# Patient Record
Sex: FEMALE | Race: WHITE | Employment: UNEMPLOYED | URBAN - METROPOLITAN AREA
[De-identification: names, ages, dates, MRNs, and addresses within clinical notes are randomized per-mention and may not be internally consistent; named-entity substitution may affect disease eponyms.]

---

## 2017-01-10 PROBLEM — M47.816 SPONDYLOSIS OF LUMBAR REGION WITHOUT MYELOPATHY OR RADICULOPATHY: Status: ACTIVE | Noted: 2017-01-10

## 2017-08-12 PROCEDURE — 81001 URINALYSIS AUTO W/SCOPE: CPT | Performed by: INTERNAL MEDICINE

## 2017-08-18 PROCEDURE — 88175 CYTOPATH C/V AUTO FLUID REDO: CPT | Performed by: OBSTETRICS & GYNECOLOGY

## 2017-08-18 PROCEDURE — 87624 HPV HI-RISK TYP POOLED RSLT: CPT | Performed by: OBSTETRICS & GYNECOLOGY

## 2017-10-09 ENCOUNTER — OFFICE VISIT (OUTPATIENT)
Dept: FAMILY MEDICINE CLINIC | Facility: CLINIC | Age: 62
End: 2017-10-09

## 2017-10-09 VITALS
HEIGHT: 64 IN | HEART RATE: 66 BPM | BODY MASS INDEX: 23.73 KG/M2 | DIASTOLIC BLOOD PRESSURE: 88 MMHG | SYSTOLIC BLOOD PRESSURE: 132 MMHG | TEMPERATURE: 99 F | WEIGHT: 139 LBS | RESPIRATION RATE: 16 BRPM

## 2017-10-09 DIAGNOSIS — F41.1 ANXIETY STATE: ICD-10-CM

## 2017-10-09 DIAGNOSIS — F51.01 PRIMARY INSOMNIA: ICD-10-CM

## 2017-10-09 DIAGNOSIS — K21.9 GASTROESOPHAGEAL REFLUX DISEASE WITHOUT ESOPHAGITIS: ICD-10-CM

## 2017-10-09 DIAGNOSIS — I10 ESSENTIAL HYPERTENSION: ICD-10-CM

## 2017-10-09 DIAGNOSIS — M54.16 LUMBAR RADICULOPATHY: Primary | ICD-10-CM

## 2017-10-09 DIAGNOSIS — E03.8 OTHER SPECIFIED HYPOTHYROIDISM: ICD-10-CM

## 2017-10-09 PROCEDURE — 99204 OFFICE O/P NEW MOD 45 MIN: CPT | Performed by: INTERNAL MEDICINE

## 2017-10-09 RX ORDER — LORAZEPAM 0.5 MG/1
0.5 TABLET ORAL EVERY 6 HOURS PRN
Qty: 30 TABLET | Refills: 1 | Status: SHIPPED | OUTPATIENT
Start: 2017-10-09 | End: 2017-11-27

## 2017-10-09 RX ORDER — LEVOTHYROXINE SODIUM 0.05 MG/1
TABLET ORAL
Qty: 90 TABLET | Refills: 3 | Status: SHIPPED | OUTPATIENT
Start: 2017-10-12 | End: 2018-08-18

## 2017-10-09 RX ORDER — AMITRIPTYLINE HYDROCHLORIDE 100 MG/1
TABLET, FILM COATED ORAL
Qty: 90 TABLET | Refills: 0 | Status: SHIPPED | OUTPATIENT
Start: 2017-10-09 | End: 2017-11-27

## 2017-10-09 RX ORDER — OMEPRAZOLE 40 MG/1
CAPSULE, DELAYED RELEASE ORAL
Qty: 90 CAPSULE | Refills: 1 | Status: SHIPPED | OUTPATIENT
Start: 2017-10-09 | End: 2018-04-02

## 2017-10-09 RX ORDER — METHOCARBAMOL 750 MG/1
TABLET, FILM COATED ORAL
Qty: 90 TABLET | Refills: 0 | Status: SHIPPED | OUTPATIENT
Start: 2017-10-09 | End: 2017-11-27 | Stop reason: ALTCHOICE

## 2017-10-09 NOTE — PROGRESS NOTES
CC: Patient presents with:  Establish Care  Insomnia  Anxiety  Imm/Inj: flu shot today       HPI:     Here for establish   Has been on and off doxycycline / gets some hair falling out.     HLD   Denies any issues   Has been taking lovastatin 40 mg q day triamcinolone acetonide 0.025 % External Lotion Apply daily to eyebrow Disp: 60 mL Rfl: 2   Clobetasol Propionate 0.05 % External Gel Apply daily to scalp Disp: 60 g Rfl: 2   Fluocinonide 0.05 % External Solution Apply QHS to scalp Disp: 60 mL Rfl: 2   i otherwise, denied any fevers chills or night sweats   RESPIRATORY: denies shortness of breath   CARDIOVASCULAR: denies chest pain  GI: denies abdominal pain    EXAM:   /88   Pulse 66   Temp 98.8 °F (37.1 °C) (Oral)   Resp 16   Ht 64\"   Wt 139 lb   B zoloft  -encouraged patient to seek care with lester Arrieta at this time    Primary insomnia  -increase amitiptyline to 100 mg q day   -advised of side effects and adverse effects of this medication    Other specified hypothyroidism  -continue synthroid

## 2017-10-16 ENCOUNTER — TELEPHONE (OUTPATIENT)
Dept: FAMILY MEDICINE CLINIC | Facility: CLINIC | Age: 62
End: 2017-10-16

## 2017-10-16 NOTE — TELEPHONE ENCOUNTER
Dr. Regan Pride- New rx received for Amitriptyline HCl 100 MG Oral Tab90 [id number]/9/2017. Pt  just picked up the 50mg on 9/15/17. Do you want the 100mg filled. Please advise.

## 2017-11-03 ENCOUNTER — TELEPHONE (OUTPATIENT)
Dept: FAMILY MEDICINE CLINIC | Facility: CLINIC | Age: 62
End: 2017-11-03

## 2017-11-03 RX ORDER — SERTRALINE HYDROCHLORIDE 100 MG/1
100 TABLET, FILM COATED ORAL DAILY
Qty: 90 TABLET | Refills: 1 | Status: SHIPPED | OUTPATIENT
Start: 2017-11-03 | End: 2018-02-07

## 2017-11-03 RX ORDER — SERTRALINE HYDROCHLORIDE 100 MG/1
50 TABLET, FILM COATED ORAL DAILY
Qty: 90 TABLET | Refills: 3 | Status: SHIPPED | OUTPATIENT
Start: 2017-11-03 | End: 2017-11-27 | Stop reason: DRUGHIGH

## 2017-11-03 NOTE — TELEPHONE ENCOUNTER
Pt sts that Dr Zahraa Mendez told her to take 2 daily and not 1 daily, sts that she wants to clarify correct dosage and if it is to be 2 daily she wants a new rx sent to mail order pharmacy. Sertraline HCl 50 MG Oral Tab 90 tablet 1 10/9/2017     Sig - Route:  Ta

## 2017-11-03 NOTE — TELEPHONE ENCOUNTER
Time started: 133    Time ended: 137    Total time spent on chart:  4min    Patient said you told her to increase her sertraline from 50mg to 100mg daily. I did see the increase in Amitriptyline which I discussed with patient.   Patient said is was also th

## 2017-11-03 NOTE — TELEPHONE ENCOUNTER
If she has been taking sertaline 100 mg and tolerating ok, ok to continue. Sometimes sertraline and amitriptyline can interact at higher doses.    If she has increased dose for past month and not had issues ok to keep current tdose, rx sent

## 2017-11-03 NOTE — TELEPHONE ENCOUNTER
Patient has been taking 2 of the 50mg Sertraline and doing well. There was an order for Sertraline 100mg sent to pharmacy but it is for 1/2 tablet daily quantity #90. Pharmacy will not fill that way. New RX sent as one daily of 100mg.     Time started: 2

## 2017-11-10 ENCOUNTER — TELEPHONE (OUTPATIENT)
Dept: FAMILY MEDICINE CLINIC | Facility: CLINIC | Age: 62
End: 2017-11-10

## 2017-11-10 NOTE — TELEPHONE ENCOUNTER
Time started: 113    Time ended: 116    Total time spent on chart: 3 min    I spoke with patient to let her know a new RX was sent 11/3/17 and listed as one daily with new directions after the other RX was sent as 1/2. They sent the old directions to her.

## 2017-11-10 NOTE — TELEPHONE ENCOUNTER
Dr. Cornelius Rosales- Pt needs clarification on doze of Sertraline HCl 100 MG Oral Tab90 [id number]/3/2017. Pt understood that she was going to be taking 100mg. Instructions state cut in half. Please clarify if she should take 100mg or 50mg.

## 2017-12-01 NOTE — PROGRESS NOTES
CC: Patient presents with: Anxiety  Insomnia       HPI:     Here for follow up   Amitriptyline 100 mg q day has been working well for her.    Denies any issues    Struggling with anxiety still   Still having some symptoms   Wants refill of lorazepam fo right axillary node infection, formed absess and required hospitalization   • Human papillomavirus in conditions classified elsewhere and of unspecified site     conization   • Hypercholesterolemia    • HYPERLIPIDEMIA    • Hyperthyroidism 1/16   • Lumba orders of the defined types were placed in this encounter.        Signed Prescriptions Disp Refills    Amitriptyline HCl 100 MG Oral Tab 90 tablet 1      Sig: TAKE 1 BY MOUTH NIGHTLY      LORazepam 0.5 MG Oral Tab 30 tablet 2      Sig: Take 1 tablet (0.5 mg

## 2017-12-28 NOTE — TELEPHONE ENCOUNTER
Requesting Tramadol-Acetaminophen   LOV: 11/27/17  RTC: 3 months   Last Relevant Labs: n/a   Filled: 10/9/17 #30 with 2 refills    Future Appointments  Date Time Provider Og Martinez   12/29/2017 9:30 AM [facility], Pain Management [facility] AT

## 2018-01-03 NOTE — TELEPHONE ENCOUNTER
Called pt to discuss further, claims pain clinic not helping much. Unaware that she was to wean off Tramadol, claims only taking 1 every night.   Needs to establish care with new PCP (Dr. Shauna Muhammad), scheduled Future Appointment:  Date Time Provider Justus Ramos

## 2018-01-17 ENCOUNTER — OFFICE VISIT (OUTPATIENT)
Dept: FAMILY MEDICINE CLINIC | Facility: CLINIC | Age: 63
End: 2018-01-17

## 2018-01-17 VITALS
SYSTOLIC BLOOD PRESSURE: 130 MMHG | DIASTOLIC BLOOD PRESSURE: 80 MMHG | HEIGHT: 64 IN | BODY MASS INDEX: 25.18 KG/M2 | WEIGHT: 147.5 LBS | TEMPERATURE: 99 F | HEART RATE: 72 BPM | RESPIRATION RATE: 16 BRPM

## 2018-01-17 DIAGNOSIS — Z76.89 ENCOUNTER TO ESTABLISH CARE WITH NEW DOCTOR: Primary | ICD-10-CM

## 2018-01-17 DIAGNOSIS — M51.36 DDD (DEGENERATIVE DISC DISEASE), LUMBAR: ICD-10-CM

## 2018-01-17 PROCEDURE — 99214 OFFICE O/P EST MOD 30 MIN: CPT | Performed by: FAMILY MEDICINE

## 2018-01-17 NOTE — PROGRESS NOTES
HPI:   Denisa Huggins is a 63year old female that presents to establish care with new PCP. She has hx of chronic low back and neck pain s/p cervical fusion, currently managed with epidural injections, amitriptiline and tramadol.   She sees Dr. Thea Connelly Take 1 tablet by mouth every 8 (eight) hours as needed for Pain.  TAKE ONE TABLET BY MOUTH EVERY 6 HOURS AS NEEDED FOR PAIN, Disp: 30 tablet, Rfl: 0  •  LORazepam 0.5 MG Oral Tab, Take 1 tablet (0.5 mg total) by mouth every 8 (eight) hours as needed for Anx PAIN MANGEMENT    Risks, benefits, and alternatives of current treatment plan discussed in detail. Questions and concerns addressed. Red flags to RTC or ED reviewed. Patient (or parent) agrees to plan.       Return in about 6 months (around 7/17/2018) for

## 2018-01-18 NOTE — TELEPHONE ENCOUNTER
Requesting Tramadol-acetaminophen   LOV: 1/17/18  RTC: 6 months   Last Relevant Labs: n/a   Filled: 10/9/17 #30 with 2 refills    Future Appointments  Date Time Provider Og Martinez   2/7/2018 5:00 PM Laurita Quinonez,  [facility] [address]       Unionron Peppers

## 2018-01-22 NOTE — TELEPHONE ENCOUNTER
Requesting lorazepam AND Tramadol-acetaminophen  LOV: 1/17/18  RTC: 6 months  Last Relevant Labs: n/a  Filled alprazolam: #30 with 2  refills  Filled Tram/acet: 10/9/17 #30 with 2 refills      Future Appointments  Date Time Provider Og Martinez   2/7/2018 5:00 PM Laurita Quinonez,  [facility] [address]       [place] Tram/acet 12/8/17   and Lorazepam 12/28/17  Pended if okay to refill.

## 2018-01-23 ENCOUNTER — TELEPHONE (OUTPATIENT)
Dept: FAMILY MEDICINE CLINIC | Facility: CLINIC | Age: 63
End: 2018-01-23

## 2018-01-23 RX ORDER — LORAZEPAM 0.5 MG/1
0.5 TABLET ORAL EVERY 8 HOURS PRN
Qty: 30 TABLET | Refills: 0 | Status: SHIPPED | OUTPATIENT
Start: 2018-01-23 | End: 2018-02-02

## 2018-01-23 RX ORDER — LORAZEPAM 0.5 MG/1
TABLET ORAL
Qty: 30 TABLET | Refills: 1
Start: 2018-01-23

## 2018-01-23 NOTE — TELEPHONE ENCOUNTER
Spoke to pharmacy, Fabian Conner and informed of RX sig change per Dr Mariella Overton. Pharmacist verbalized understanding.

## 2018-01-23 NOTE — TELEPHONE ENCOUNTER
Tramadol with Acetaminophen refilled by MD - faxed to [facility] and confirmed and Lorazepam also. Patient informed to keep her appointment with pain clinic. SHANNA'E both confirmed to [place] and patient verified that is where she wanted them to go.     Time: 3 min

## 2018-01-23 NOTE — TELEPHONE ENCOUNTER
Pharmacy is calling for clarification of sig. Please advise if pt should take every 8hr or every 6 hrs?        Medication Quantity Refills Start End   tramadol-acetaminophen 37.5-325 MG Oral Tab 30 tablet 0 1/23/2018 2/22/2018   Sig :  Take 1 tablet by julius

## 2018-01-23 NOTE — TELEPHONE ENCOUNTER
Laurita Quinonez, DO   [facility] 6 minutes ago (4:01 PM)      Can change to q6h prn (Routing comment)

## 2018-01-23 NOTE — TELEPHONE ENCOUNTER
Will refill tramadol #30 pills and patient to follow up with her established pain doctor, Dr Cuba Francisco.       Sujey Echeverria, DO  Family Medicine

## 2018-01-23 NOTE — TELEPHONE ENCOUNTER
Confirmed pt Lorazepam prescription from 11/27/17 [place] given [place]  and Ryder Hipps at [address] confirmed same. Lorazepam called in as VO to Denisa Stone) at [address] [place] [address] term supply of Tramadol pended and pt notified it will further be managed by pain management. Pt is out of Tram/acet, requesting short-term supply until she can get into pain clinic, already established with Dr. Ama Ruffin with the Pain Clinic. Pended if okay to refill.

## 2018-02-02 ENCOUNTER — APPOINTMENT (OUTPATIENT)
Dept: CT IMAGING | Age: 63
End: 2018-02-02
Attending: EMERGENCY MEDICINE
Payer: COMMERCIAL

## 2018-02-02 ENCOUNTER — OFFICE VISIT (OUTPATIENT)
Dept: FAMILY MEDICINE CLINIC | Facility: CLINIC | Age: 63
End: 2018-02-02

## 2018-02-02 ENCOUNTER — HOSPITAL ENCOUNTER (EMERGENCY)
Age: 63
Discharge: HOME | End: 2018-02-02
Attending: EMERGENCY MEDICINE
Payer: COMMERCIAL

## 2018-02-02 VITALS
SYSTOLIC BLOOD PRESSURE: 130 MMHG | OXYGEN SATURATION: 100 % | BODY MASS INDEX: 24.75 KG/M2 | RESPIRATION RATE: 16 BRPM | HEART RATE: 74 BPM | WEIGHT: 145 LBS | TEMPERATURE: 97 F | DIASTOLIC BLOOD PRESSURE: 77 MMHG | HEIGHT: 64 IN

## 2018-02-02 VITALS
RESPIRATION RATE: 16 BRPM | HEIGHT: 64 IN | TEMPERATURE: 97 F | HEART RATE: 72 BPM | DIASTOLIC BLOOD PRESSURE: 80 MMHG | SYSTOLIC BLOOD PRESSURE: 110 MMHG | WEIGHT: 146.38 LBS | BODY MASS INDEX: 24.99 KG/M2

## 2018-02-02 DIAGNOSIS — G43.809 OTHER MIGRAINE WITHOUT STATUS MIGRAINOSUS, NOT INTRACTABLE: Primary | ICD-10-CM

## 2018-02-02 DIAGNOSIS — R51.9 ACUTE INTRACTABLE HEADACHE, UNSPECIFIED HEADACHE TYPE: Primary | ICD-10-CM

## 2018-02-02 PROBLEM — G95.9 CERVICAL MYELOPATHY (HCC): Status: ACTIVE | Noted: 2018-02-02

## 2018-02-02 LAB
ALBUMIN SERPL-MCNC: 4 G/DL (ref 3.5–4.8)
ALP LIVER SERPL-CCNC: 56 U/L (ref 46–118)
ALT SERPL-CCNC: 23 U/L (ref 14–54)
AST SERPL-CCNC: 21 U/L (ref 15–41)
ATRIAL RATE: 67 BPM
BASOPHILS # BLD AUTO: 0.02 X10(3) UL (ref 0–0.1)
BASOPHILS NFR BLD AUTO: 0.3 %
BILIRUB SERPL-MCNC: 0.3 MG/DL (ref 0.1–2)
BUN BLD-MCNC: 22 MG/DL (ref 8–20)
CALCIUM BLD-MCNC: 9 MG/DL (ref 8.3–10.3)
CHLORIDE: 99 MMOL/L (ref 101–111)
CO2: 29 MMOL/L (ref 22–32)
CREAT BLD-MCNC: 1.05 MG/DL (ref 0.55–1.02)
EOSINOPHIL # BLD AUTO: 0.1 X10(3) UL (ref 0–0.3)
EOSINOPHIL NFR BLD AUTO: 1.6 %
ERYTHROCYTE [DISTWIDTH] IN BLOOD BY AUTOMATED COUNT: 13.1 % (ref 11.5–16)
GLUCOSE BLD-MCNC: 91 MG/DL (ref 70–99)
HCT VFR BLD AUTO: 41 % (ref 34–50)
HGB BLD-MCNC: 13.6 G/DL (ref 12–16)
IMMATURE GRANULOCYTE COUNT: 0.02 X10(3) UL (ref 0–1)
IMMATURE GRANULOCYTE RATIO %: 0.3 %
LYMPHOCYTES # BLD AUTO: 1.95 X10(3) UL (ref 0.9–4)
LYMPHOCYTES NFR BLD AUTO: 30.3 %
M PROTEIN MFR SERPL ELPH: 7.9 G/DL (ref 6.1–8.3)
MCH RBC QN AUTO: 30.2 PG (ref 27–33.2)
MCHC RBC AUTO-ENTMCNC: 33.2 G/DL (ref 31–37)
MCV RBC AUTO: 91.1 FL (ref 81–100)
MONOCYTES # BLD AUTO: 0.5 X10(3) UL (ref 0.1–0.6)
MONOCYTES NFR BLD AUTO: 7.8 %
NEUTROPHIL ABS PRELIM: 3.84 X10 (3) UL (ref 1.3–6.7)
NEUTROPHILS # BLD AUTO: 3.84 X10(3) UL (ref 1.3–6.7)
NEUTROPHILS NFR BLD AUTO: 59.7 %
P AXIS: 31 DEGREES
P-R INTERVAL: 170 MS
PLATELET # BLD AUTO: 276 10(3)UL (ref 150–450)
POTASSIUM SERPL-SCNC: 4.5 MMOL/L (ref 3.6–5.1)
Q-T INTERVAL: 386 MS
QRS DURATION: 78 MS
QTC CALCULATION (BEZET): 407 MS
R AXIS: -5 DEGREES
RBC # BLD AUTO: 4.5 X10(6)UL (ref 3.8–5.1)
RED CELL DISTRIBUTION WIDTH-SD: 42.9 FL (ref 35.1–46.3)
SODIUM SERPL-SCNC: 136 MMOL/L (ref 136–144)
T AXIS: 28 DEGREES
VENTRICULAR RATE: 67 BPM
WBC # BLD AUTO: 6.4 X10(3) UL (ref 4–13)

## 2018-02-02 PROCEDURE — 93005 ELECTROCARDIOGRAM TRACING: CPT

## 2018-02-02 PROCEDURE — 96374 THER/PROPH/DIAG INJ IV PUSH: CPT

## 2018-02-02 PROCEDURE — 99285 EMERGENCY DEPT VISIT HI MDM: CPT

## 2018-02-02 PROCEDURE — 96375 TX/PRO/DX INJ NEW DRUG ADDON: CPT

## 2018-02-02 PROCEDURE — 70450 CT HEAD/BRAIN W/O DYE: CPT | Performed by: EMERGENCY MEDICINE

## 2018-02-02 PROCEDURE — 93010 ELECTROCARDIOGRAM REPORT: CPT

## 2018-02-02 PROCEDURE — 80053 COMPREHEN METABOLIC PANEL: CPT | Performed by: EMERGENCY MEDICINE

## 2018-02-02 PROCEDURE — 99214 OFFICE O/P EST MOD 30 MIN: CPT | Performed by: FAMILY MEDICINE

## 2018-02-02 PROCEDURE — 85025 COMPLETE CBC W/AUTO DIFF WBC: CPT | Performed by: EMERGENCY MEDICINE

## 2018-02-02 RX ORDER — DIPHENHYDRAMINE HYDROCHLORIDE 50 MG/ML
25 INJECTION INTRAMUSCULAR; INTRAVENOUS ONCE
Status: COMPLETED | OUTPATIENT
Start: 2018-02-02 | End: 2018-02-02

## 2018-02-02 RX ORDER — ONDANSETRON 2 MG/ML
4 INJECTION INTRAMUSCULAR; INTRAVENOUS ONCE
Status: COMPLETED | OUTPATIENT
Start: 2018-02-02 | End: 2018-02-02

## 2018-02-02 RX ORDER — KETOROLAC TROMETHAMINE 30 MG/ML
15 INJECTION, SOLUTION INTRAMUSCULAR; INTRAVENOUS ONCE
Status: COMPLETED | OUTPATIENT
Start: 2018-02-02 | End: 2018-02-02

## 2018-02-02 RX ORDER — ONDANSETRON 4 MG/1
4 TABLET, ORALLY DISINTEGRATING ORAL EVERY 4 HOURS PRN
Qty: 10 TABLET | Refills: 0 | Status: SHIPPED | OUTPATIENT
Start: 2018-02-02 | End: 2018-02-09

## 2018-02-02 RX ORDER — BUTALBITAL, ACETAMINOPHEN AND CAFFEINE 50; 325; 40 MG/1; MG/1; MG/1
1-2 TABLET ORAL EVERY 4 HOURS PRN
Qty: 20 TABLET | Refills: 0 | Status: SHIPPED | OUTPATIENT
Start: 2018-02-02 | End: 2018-02-09

## 2018-02-02 NOTE — PROGRESS NOTES
HPI:   Lloyd Bustillos is a 63year old female that presents for acute intractable headache for one week.   She had mild headache for 3 weeks, and then acutely worsening to \"migraine\" type symptoms one week ago though patient has never had migraine befor [place] ) 97.2 °F (36.2 °C) (Oral)   Resp 16   Ht 64\"   Wt 146 lb 6 oz   BMI 25.13 kg/m²  Estimated body mass index is 25.13 kg/m² as calculated from the following:    Height as of this encounter: 64\". Weight as of this encounter: 146 lb 6 oz.    Vital signs

## 2018-02-07 ENCOUNTER — TELEPHONE (OUTPATIENT)
Dept: FAMILY MEDICINE CLINIC | Facility: CLINIC | Age: 63
End: 2018-02-07

## 2018-02-07 ENCOUNTER — OFFICE VISIT (OUTPATIENT)
Dept: FAMILY MEDICINE CLINIC | Facility: CLINIC | Age: 63
End: 2018-02-07

## 2018-02-07 ENCOUNTER — HOSPITAL ENCOUNTER (OUTPATIENT)
Dept: MRI IMAGING | Age: 63
Discharge: HOME | End: 2018-02-07
Attending: FAMILY MEDICINE
Payer: COMMERCIAL

## 2018-02-07 VITALS
HEART RATE: 72 BPM | RESPIRATION RATE: 16 BRPM | TEMPERATURE: 100 F | HEIGHT: 64 IN | DIASTOLIC BLOOD PRESSURE: 70 MMHG | SYSTOLIC BLOOD PRESSURE: 120 MMHG | BODY MASS INDEX: 24.92 KG/M2 | WEIGHT: 146 LBS

## 2018-02-07 DIAGNOSIS — R42 DIZZINESS: ICD-10-CM

## 2018-02-07 DIAGNOSIS — R11.0 NAUSEA: ICD-10-CM

## 2018-02-07 DIAGNOSIS — R51.9 ACUTE INTRACTABLE HEADACHE, UNSPECIFIED HEADACHE TYPE: ICD-10-CM

## 2018-02-07 DIAGNOSIS — R51.9 ACUTE INTRACTABLE HEADACHE, UNSPECIFIED HEADACHE TYPE: Primary | ICD-10-CM

## 2018-02-07 PROCEDURE — 99214 OFFICE O/P EST MOD 30 MIN: CPT | Performed by: FAMILY MEDICINE

## 2018-02-07 PROCEDURE — 70551 MRI BRAIN STEM W/O DYE: CPT | Performed by: FAMILY MEDICINE

## 2018-02-07 RX ORDER — PREDNISONE 20 MG/1
40 TABLET ORAL DAILY
Qty: 10 TABLET | Refills: 0 | Status: SHIPPED | OUTPATIENT
Start: 2018-02-07 | End: 2018-02-12

## 2018-02-07 RX ORDER — SUMATRIPTAN 25 MG/1
25 TABLET, FILM COATED ORAL ONCE
Qty: 9 TABLET | Refills: 0 | Status: SHIPPED | OUTPATIENT
Start: 2018-02-07 | End: 2018-02-07

## 2018-02-07 RX ORDER — LORAZEPAM 0.5 MG/1
1 TABLET ORAL NIGHTLY PRN
Refills: 0 | COMMUNITY
Start: 2018-01-29 | End: 2018-05-17

## 2018-02-07 RX ORDER — PREDNISONE 20 MG/1
40 TABLET ORAL DAILY
Qty: 10 TABLET | Refills: 0 | Status: SHIPPED | OUTPATIENT
Start: 2018-02-07 | End: 2018-02-07 | Stop reason: CLARIF

## 2018-02-07 RX ORDER — AMITRIPTYLINE HYDROCHLORIDE 50 MG/1
1 TABLET, FILM COATED ORAL DAILY
Refills: 0 | COMMUNITY
Start: 2017-12-11 | End: 2018-04-21 | Stop reason: DRUGHIGH

## 2018-02-07 NOTE — PATIENT INSTRUCTIONS
Prednisone 40 mg daily for 5 days. MRI Brain - if no improvement or symptoms worsen must go to Emergency Department  Neurology referral, we will try to get you ASAP appt in AM      Headache, Unspecified    A number of things can cause headaches.  The caus around bright indoor lighting until your symptoms get better. Bright glaring light can make this type of headache worse. Follow-up care  Follow up with your healthcare provider, or as advised. Talk with your provider if you have frequent headaches.  He or

## 2018-02-07 NOTE — PROGRESS NOTES
HPI:   Rebeca Parks is a 63year old female that presents for emergency room follow-up.     She was seen in office on February 2 for acute intractable headache, started slowly 3 weeks before then acutely worsened one week before visit to, worst headache triamcinolone acetonide 0.025 % External Lotion, Apply daily to eyebrow, Disp: 60 mL, Rfl: 2  •  Clobetasol Propionate 0.05 % External Gel, Apply daily to scalp, Disp: 60 g, Rfl: 2  •  Fluocinonide 0.05 % External Solution, Apply QHS to scalp, Disp: 60 mL, headache type with nausea and dizziness  - constant severe headache progressing over several weeks with nonstop pain. Not improved with toradol, zofran, fiorecet or OTC meds.     - CT scan in ED negative and pain continues to be constant and severe, will g

## 2018-02-08 NOTE — TELEPHONE ENCOUNTER
Stat MRI normal. Pt informed. Her  is going out to get prednisone script right now and will take tonight. Advised to take with food/milk tonight along with her other meds given in the Er (ondansetron, fioricet).  Reminded her to call neurology tomorr

## 2018-02-26 ENCOUNTER — OFFICE VISIT (OUTPATIENT)
Dept: NEUROLOGY | Facility: CLINIC | Age: 63
End: 2018-02-26

## 2018-02-26 VITALS
SYSTOLIC BLOOD PRESSURE: 144 MMHG | WEIGHT: 158 LBS | BODY MASS INDEX: 27 KG/M2 | HEART RATE: 72 BPM | DIASTOLIC BLOOD PRESSURE: 82 MMHG | RESPIRATION RATE: 16 BRPM

## 2018-02-26 DIAGNOSIS — G44.86 CERVICOGENIC HEADACHE: ICD-10-CM

## 2018-02-26 DIAGNOSIS — M54.12 CERVICAL RADICULOPATHY: ICD-10-CM

## 2018-02-26 DIAGNOSIS — G43.009 MIGRAINE WITHOUT AURA AND WITHOUT STATUS MIGRAINOSUS, NOT INTRACTABLE: Primary | ICD-10-CM

## 2018-02-26 DIAGNOSIS — R42 LIGHTHEADEDNESS: ICD-10-CM

## 2018-02-26 PROCEDURE — 99244 OFF/OP CNSLTJ NEW/EST MOD 40: CPT | Performed by: OTHER

## 2018-02-26 RX ORDER — SUMATRIPTAN 25 MG/1
25 TABLET, FILM COATED ORAL EVERY 2 HOUR PRN
Qty: 9 TABLET | Refills: 3 | Status: SHIPPED | OUTPATIENT
Start: 2018-02-26 | End: 2018-03-01

## 2018-02-26 RX ORDER — BUTALBITAL, ACETAMINOPHEN AND CAFFEINE 50; 325; 40 MG/1; MG/1; MG/1
1 TABLET ORAL EVERY 4 HOURS PRN
COMMUNITY
End: 2018-08-18

## 2018-02-26 RX ORDER — SUMATRIPTAN 25 MG/1
25 TABLET, FILM COATED ORAL EVERY 2 HOUR PRN
COMMUNITY
End: 2018-02-26

## 2018-02-26 NOTE — PROGRESS NOTES
[facility]   Neurology- INITIAL CLINIC VISIT  2018, 11:25 AM     Husam Mark Patient Status:  No patient class for patient encounter    1955 MRN [id number]   Location [address] [place] [address] Lawrence Damianing vomiting)    • Symptomatic menopausal or female climacteric states    • Unspecified essential hypertension         Past Surgical History:  Past Surgical History:  12/11/2014: COLONOSCOPY N/A      Comment: Procedure: COLONOSCOPY;  Surgeon: Alex Easton, TRANSFORAMINAL EPIDURAL - LUMBAR;                 Surgeon: Pete Corea MD;  Location: [address] MANAGEMENT  1/30/2014: INJECTION, W/WO CONTRAST, DX/THERAPEUTIC SUBST*      Comment: Procedure: THORACIC EPIDURAL;  Surgeon: Patrizia [id number]    Family History:  family history includes Breast Cancer in an other family member; Diabetes in her mother; Hypertension in her father and mother;  Other in her daughter, father, and mother; Psychiatric in her daughter a needed for Pain., Disp: , Rfl:   •  CALCIUM + D 600-200 MG-UNIT OR TABS, 1 TABLET DAILY, Disp: , Rfl:       Review of Systems:   A comprehensive 10 point review of systems was completed.     Pertinent positives and negatives noted in the HPI.          PHYSI on migraine causes, triggers, lifestyle influences, and prevention of medication overuse headache. At this time, I recommend the best option would be to increase her Elavil dose to 100mg again, and stay on this for 4-6 weeks.  If there is no change in [place]

## 2018-02-26 NOTE — PATIENT INSTRUCTIONS
Refill policies:    • Allow 2-3 business days for refills; controlled substances may take longer.   • Contact your pharmacy at least 5 days prior to running out of medication and have them send an electronic request or submit request through the [facility] recommended that you have a procedure or additional testing performed. [facility]) will contact your insurance carrier to obtain pre-certification or prior authorization.     Unfortunately, RAM[facility]N has seen an increase in denial of paym

## 2018-02-28 RX ORDER — SUMATRIPTAN 25 MG/1
TABLET, FILM COATED ORAL
Qty: 9 TABLET | Refills: 0 | OUTPATIENT
Start: 2018-02-28

## 2018-02-28 NOTE — TELEPHONE ENCOUNTER
SUMAtriptan Succinate Oral Tablet 25 MG  Will file in chart as: SUMATRIPTAN SUCCINATE 25 MG Oral Tab  take 1 tablet by (25 mg total) one time.  May repeat in 2 hours if migraine if still bad (if not improving call MD)       Disp: 9 tablet (Pharmacy requeste

## 2018-03-01 ENCOUNTER — TELEPHONE (OUTPATIENT)
Dept: NEUROLOGY | Facility: CLINIC | Age: 63
End: 2018-03-01

## 2018-03-01 DIAGNOSIS — G43.009 MIGRAINE WITHOUT AURA AND WITHOUT STATUS MIGRAINOSUS, NOT INTRACTABLE: Primary | ICD-10-CM

## 2018-03-01 RX ORDER — SUMATRIPTAN 25 MG/1
TABLET, FILM COATED ORAL
Qty: 9 TABLET | Refills: 3 | COMMUNITY
Start: 2018-03-01 | End: 2018-05-29

## 2018-03-01 NOTE — TELEPHONE ENCOUNTER
Pharmacy asked for revised directions and max quantity on sumatripan Rx. Standard directions from [facility] preference list directions faxed back to pharmacy. Revised directions placed into [employer].

## 2018-03-20 PROBLEM — N28.89 RENAL MASS: Status: ACTIVE | Noted: 2018-03-20

## 2018-03-26 PROBLEM — M48.062 SPINAL STENOSIS OF LUMBAR REGION WITH NEUROGENIC CLAUDICATION: Status: ACTIVE | Noted: 2018-03-26

## 2018-04-02 NOTE — TELEPHONE ENCOUNTER
Recd refill request from [facility] ([facility]) for Omeprazole 40 mg capsules for quantity of 90.

## 2018-04-03 RX ORDER — OMEPRAZOLE 40 MG/1
CAPSULE, DELAYED RELEASE ORAL
Qty: 90 CAPSULE | Refills: 1 | Status: SHIPPED | OUTPATIENT
Start: 2018-04-03 | End: 2018-08-18

## 2018-04-03 NOTE — TELEPHONE ENCOUNTER
Omeprazole 40 MG Oral Capsule Delayed Release  TAKE 1 BY MOUTH DAILY       Disp: 90 capsule Refills: 1    Class: Normal Start: 4/2/2018   Originally ordered: 2 years ago by Coni Mccoy MD    Last time medication refilled: 10/9/2017 QTY:90 with 1 refill

## 2018-04-10 NOTE — TELEPHONE ENCOUNTER
Requesting Methocarbamol 750 mg  LOV: 2/2/18  RTC: not noted  Last Relevant Labs: n/a  Filled: 10/9/17 #90 with 0 refills  Dr. Kaylene Milton    Lumbar radiculopathy, cervical myelopathy  Future Appointments  Date Time Provider Og Martinez   4/11/2018 1:45 PM

## 2018-04-11 RX ORDER — METHOCARBAMOL 750 MG/1
TABLET, FILM COATED ORAL
Qty: 90 TABLET | Refills: 0 | Status: SHIPPED | OUTPATIENT
Start: 2018-04-11 | End: 2018-07-08

## 2018-04-21 ENCOUNTER — OFFICE VISIT (OUTPATIENT)
Dept: FAMILY MEDICINE CLINIC | Facility: CLINIC | Age: 63
End: 2018-04-21

## 2018-04-21 VITALS
WEIGHT: 152.25 LBS | RESPIRATION RATE: 18 BRPM | BODY MASS INDEX: 25.99 KG/M2 | HEART RATE: 72 BPM | HEIGHT: 64 IN | DIASTOLIC BLOOD PRESSURE: 70 MMHG | TEMPERATURE: 99 F | SYSTOLIC BLOOD PRESSURE: 118 MMHG

## 2018-04-21 DIAGNOSIS — G43.009 MIGRAINE WITHOUT AURA AND WITHOUT STATUS MIGRAINOSUS, NOT INTRACTABLE: Primary | ICD-10-CM

## 2018-04-21 DIAGNOSIS — N28.89 BILATERAL RENAL MASSES: ICD-10-CM

## 2018-04-21 PROCEDURE — 99214 OFFICE O/P EST MOD 30 MIN: CPT | Performed by: FAMILY MEDICINE

## 2018-04-21 RX ORDER — TOPIRAMATE 25 MG/1
25 TABLET ORAL 2 TIMES DAILY
Qty: 60 TABLET | Refills: 0 | Status: SHIPPED | OUTPATIENT
Start: 2018-04-21 | End: 2018-05-17

## 2018-04-21 RX ORDER — AMITRIPTYLINE HYDROCHLORIDE 100 MG/1
1 TABLET, FILM COATED ORAL DAILY
Refills: 1 | COMMUNITY
Start: 2018-04-03 | End: 2018-07-30 | Stop reason: DRUGHIGH

## 2018-04-21 NOTE — PROGRESS NOTES
HPI:   Arti Hussein is a 63year old female that presents for follow up migraines. She has long standing history of migraines requiring daily preventative therapy.   She was seen by neurology who recommended going back on increased dose of elavil, and i Take 1 tablet by mouth daily. , Disp: , Rfl: 1  •  topiramate 25 MG Oral Tab, Take 1 tablet (25 mg total) by mouth 2 (two) times daily. , Disp: 60 tablet, Rfl: 0  •  methocarbamol 750 MG Oral Tab, TAKE 1 BY MOUTH 4 TIMES DAILY AS NEEDED, Disp: 90 tablet, Rfl well groomed. Physical Exam:  GEN:  Patient is alert, awake and oriented, well developed, well nourished, no apparent distress.   HEENT:     Head:  Normocephalic, atraumatic    Eyes: EOMI, PERRLA, no scleral icterus, conjunctivae clear  NECK: Supple, no ce

## 2018-04-21 NOTE — PATIENT INSTRUCTIONS
Titrate down Elavil from 100 mg. Can decrease by 25 mg every 2 weeks and then stop. Start topamax 25 mg twice a day, and we can increase every few weeks as needed  Follow up with Neurology.      Schedule CT of abdomen to follow up kidney cyst

## 2018-04-25 ENCOUNTER — HOSPITAL ENCOUNTER (OUTPATIENT)
Dept: CT IMAGING | Age: 63
Discharge: HOME | End: 2018-04-25
Attending: FAMILY MEDICINE
Payer: COMMERCIAL

## 2018-04-25 DIAGNOSIS — N28.89 BILATERAL RENAL MASSES: ICD-10-CM

## 2018-04-25 PROCEDURE — 74178 CT ABD&PLV WO CNTR FLWD CNTR: CPT | Performed by: FAMILY MEDICINE

## 2018-04-25 PROCEDURE — 82565 ASSAY OF CREATININE: CPT

## 2018-04-26 DIAGNOSIS — N28.1 RENAL CYST: Primary | ICD-10-CM

## 2018-04-30 PROBLEM — M47.816 FACET ARTHROPATHY, LUMBAR: Status: ACTIVE | Noted: 2018-04-30

## 2018-05-15 ENCOUNTER — OFFICE VISIT (OUTPATIENT)
Dept: NEPHROLOGY | Facility: CLINIC | Age: 63
End: 2018-05-15

## 2018-05-15 VITALS — SYSTOLIC BLOOD PRESSURE: 130 MMHG | BODY MASS INDEX: 26 KG/M2 | DIASTOLIC BLOOD PRESSURE: 78 MMHG | WEIGHT: 152 LBS

## 2018-05-15 DIAGNOSIS — N28.1 RENAL CYST: Primary | ICD-10-CM

## 2018-05-15 DIAGNOSIS — I10 ESSENTIAL HYPERTENSION: ICD-10-CM

## 2018-05-15 PROCEDURE — 99244 OFF/OP CNSLTJ NEW/EST MOD 40: CPT | Performed by: INTERNAL MEDICINE
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
## 2018-05-17 ENCOUNTER — OFFICE VISIT (OUTPATIENT)
Dept: FAMILY MEDICINE CLINIC | Facility: CLINIC | Age: 63
End: 2018-05-17

## 2018-05-17 VITALS
HEIGHT: 64 IN | TEMPERATURE: 99 F | HEART RATE: 72 BPM | BODY MASS INDEX: 25.95 KG/M2 | SYSTOLIC BLOOD PRESSURE: 130 MMHG | RESPIRATION RATE: 16 BRPM | DIASTOLIC BLOOD PRESSURE: 80 MMHG | WEIGHT: 152 LBS

## 2018-05-17 DIAGNOSIS — N28.1 RENAL CYST: ICD-10-CM

## 2018-05-17 DIAGNOSIS — G43.009 MIGRAINE WITHOUT AURA AND WITHOUT STATUS MIGRAINOSUS, NOT INTRACTABLE: Primary | ICD-10-CM

## 2018-05-17 DIAGNOSIS — Z00.00 LABORATORY EXAM ORDERED AS PART OF ROUTINE GENERAL MEDICAL EXAMINATION: ICD-10-CM

## 2018-05-17 PROCEDURE — 99214 OFFICE O/P EST MOD 30 MIN: CPT | Performed by: FAMILY MEDICINE

## 2018-05-17 RX ORDER — LORAZEPAM 0.5 MG/1
0.5 TABLET ORAL NIGHTLY PRN
Qty: 30 TABLET | Refills: 2 | Status: SHIPPED | OUTPATIENT
Start: 2018-05-17 | End: 2018-08-18

## 2018-05-17 RX ORDER — TOPIRAMATE 25 MG/1
25 TABLET ORAL 2 TIMES DAILY
Qty: 60 TABLET | Refills: 0 | Status: SHIPPED | OUTPATIENT
Start: 2018-05-17 | End: 2018-05-29

## 2018-05-17 NOTE — PROGRESS NOTES
HPI:   Levy Jacobsen is a 66year old female that presents for follow up migraines. Patient has very severe and chronic migraines. She was trying to taper off Elavil as she thought it was making her gain weight.   She made it from 100 mg to 75 mg night LORazepam 0.5 MG Oral Tab, Take 1 tablet (0.5 mg total) by mouth nightly as needed. , Disp: 30 tablet, Rfl: 2  •  Amitriptyline HCl 100 MG Oral Tab, Take 1 tablet by mouth daily. , Disp: , Rfl: 1  •  methocarbamol 750 MG Oral Tab, TAKE 1 BY MOUTH 4 TIMES JOHNNY Normocephalic, atraumatic    Eyes: EOMI, PERRLA, no scleral icterus, conjunctivae clear, no eye discharge   NECK: Supple, no cervical LAD, no thyromegaly. SKIN: No rashes, no skin lesion, no bruising, good turgor.   HEART:  Regular rate and rhythm, no murm

## 2018-05-21 ENCOUNTER — TELEPHONE (OUTPATIENT)
Dept: FAMILY MEDICINE CLINIC | Facility: CLINIC | Age: 63
End: 2018-05-21

## 2018-05-21 DIAGNOSIS — L63.9 ALOPECIA AREATA: Primary | ICD-10-CM

## 2018-05-21 DIAGNOSIS — H01.126: ICD-10-CM

## 2018-05-21 NOTE — TELEPHONE ENCOUNTER
Kristel [facility]   Cc: [facility] Referral Pool   Phone Number: [phone]             .Reason for the order/referral: OV   PCP:  Dr Celso Salas   Refer to Provider (first and last name): Dr Jose Nath   Specialty: Dermatology   Patient In

## 2018-05-29 ENCOUNTER — OFFICE VISIT (OUTPATIENT)
Dept: NEUROLOGY | Facility: CLINIC | Age: 63
End: 2018-05-29

## 2018-05-29 VITALS
SYSTOLIC BLOOD PRESSURE: 126 MMHG | BODY MASS INDEX: 26 KG/M2 | RESPIRATION RATE: 16 BRPM | WEIGHT: 150 LBS | HEART RATE: 74 BPM | DIASTOLIC BLOOD PRESSURE: 78 MMHG

## 2018-05-29 DIAGNOSIS — G43.009 MIGRAINE WITHOUT AURA AND WITHOUT STATUS MIGRAINOSUS, NOT INTRACTABLE: Primary | ICD-10-CM

## 2018-05-29 DIAGNOSIS — G44.40 MEDICATION OVERUSE HEADACHE: ICD-10-CM

## 2018-05-29 PROCEDURE — 99215 OFFICE O/P EST HI 40 MIN: CPT | Performed by: OTHER

## 2018-05-29 RX ORDER — DIVALPROEX SODIUM 500 MG/1
500 TABLET, DELAYED RELEASE ORAL NIGHTLY
Qty: 30 TABLET | Refills: 1 | Status: SHIPPED | OUTPATIENT
Start: 2018-05-29 | End: 2018-07-02

## 2018-05-29 RX ORDER — METHYLPREDNISOLONE 4 MG/1
TABLET ORAL
Qty: 1 PACKAGE | Refills: 0 | Status: SHIPPED | OUTPATIENT
Start: 2018-05-29 | End: 2018-07-02 | Stop reason: ALTCHOICE

## 2018-05-29 RX ORDER — SUMATRIPTAN 50 MG/1
50 TABLET, FILM COATED ORAL EVERY 2 HOUR PRN
Qty: 9 TABLET | Refills: 0 | Status: SHIPPED | OUTPATIENT
Start: 2018-05-29 | End: 2018-06-11

## 2018-05-29 RX ORDER — OMEGA-3/DHA/EPA/FISH OIL 60 MG-90MG
CAPSULE ORAL DAILY
COMMUNITY

## 2018-05-29 RX ORDER — ACETAMINOPHEN 500 MG
500 TABLET ORAL DAILY PRN
COMMUNITY

## 2018-05-29 NOTE — PROGRESS NOTES
[facility] Neurology outpatient progress note  Date of service: 5/29/2018    Patient here for 2nd opinion for headache. Her headache has not improved since last visit after seeing Dr. Sherley Skinner.   She states topamax is not helping, amitriptyline made her gain some Levothyroxine Sodium 50 MCG Oral Tab, TAKE 1 BY MOUTH BEFORE BREAKFAST, Disp: 90 tablet, Rfl: 3  •  Lovastatin 40 MG Oral Tab, TAKE 1 BY MOUTH DAILY WITH DINNER, Disp: 90 tablet, Rfl: 3  •  LISINOPRIL 10 MG Oral Tab, TAKE 1 BY MOUTH DAILY, Disp: 90 tablet, CENTER FOR                PAIN MANAGEMENT  5/30/2014: INJECTION, ANESTHETIC/STEROID, TRANSFORAMINAL * Right      Comment: Procedure: TRANSFORAMINAL EPIDURAL - LUMBAR;                 Surgeon: Alok Cantor MD;  Location: [address] [place] [address] Barbara Gay MD;  Location: [place] [address] MANAGEMENT  2/27/2014: M-MATTHEW BY  SADIE FLOYD [facility] 5+ YR      Comment: Procedure: THORACIC EPIDURAL;  Surgeon:                Aida Vanegas MD;  Location: [address] [facility] Physical Examination:  /78   Pulse 74   Resp 16   Wt 150 lb   BMI 25.75 kg/m²   Lungs: clear to auscultation   CV: S1, S2+  Abdomen: soft, non tender, no masses  Extremities: no edema  Carotid bruits: no  Neurological Examination:  Mental status: MD  Neurology  [facility]  5/29/2018, 4:08 PM  Aan Escalante DO

## 2018-05-30 ENCOUNTER — TELEPHONE (OUTPATIENT)
Dept: NEUROLOGY | Facility: CLINIC | Age: 63
End: 2018-05-30

## 2018-06-11 ENCOUNTER — TELEPHONE (OUTPATIENT)
Dept: NEUROLOGY | Facility: CLINIC | Age: 63
End: 2018-06-11

## 2018-06-11 DIAGNOSIS — G43.009 MIGRAINE WITHOUT AURA AND WITHOUT STATUS MIGRAINOSUS, NOT INTRACTABLE: Primary | ICD-10-CM

## 2018-06-11 RX ORDER — SUMATRIPTAN 50 MG/1
TABLET, FILM COATED ORAL
Qty: 9 TABLET | Refills: 0 | Status: SHIPPED | OUTPATIENT
Start: 2018-06-11 | End: 2018-06-19

## 2018-06-11 NOTE — TELEPHONE ENCOUNTER
Sumatriptan #9/0 sent to mail order pharmacy, needed to go to local. Reordered to local pharmacy and informed patient.  Verbalized understanding,

## 2018-06-19 DIAGNOSIS — G43.009 MIGRAINE WITHOUT AURA AND WITHOUT STATUS MIGRAINOSUS, NOT INTRACTABLE: ICD-10-CM

## 2018-06-19 RX ORDER — SUMATRIPTAN 50 MG/1
TABLET, FILM COATED ORAL
Qty: 9 TABLET | Refills: 0 | Status: SHIPPED | OUTPATIENT
Start: 2018-06-19 | End: 2018-07-02

## 2018-06-19 NOTE — TELEPHONE ENCOUNTER
Medication: SUmatriptan 50mg     Date of last refill: 6/11/18  [place]     Last office visit: 5/29/18  Due back to clinic per last office note:  4 weeks  Date next office visit scheduled:  7/2/18    Last OV note recommen

## 2018-06-21 ENCOUNTER — NURSE ONLY (OUTPATIENT)
Dept: NEUROLOGY | Facility: CLINIC | Age: 63
End: 2018-06-21

## 2018-06-21 DIAGNOSIS — G43.009 MIGRAINE WITHOUT AURA AND WITHOUT STATUS MIGRAINOSUS, NOT INTRACTABLE: ICD-10-CM

## 2018-06-21 PROCEDURE — 95816 EEG AWAKE AND DROWSY: CPT | Performed by: OTHER

## 2018-06-21 NOTE — PROCEDURES
Date of Procedure: 6/21/2018    Procedure: EEG (ELECTROENCEPHALOGRAM)     DX: HEADACHES  HX: PT IS A 64 YO FEMALE WHO PRESENTS FOR HEADACHES. PT SAID HA HAVE NOT IMPROVED SINCE LAST VISIT TO DR Carlos Ramirez.  PT STATES TOPAMAX IS NOT HELPING, AMITRIPTYLINE MADE

## 2018-06-25 NOTE — TELEPHONE ENCOUNTER
Records Deposition Service received medical records, but did not receive copies of corresponding billing.   Oziel Flores's phone number:  [phone]

## 2018-07-02 ENCOUNTER — OFFICE VISIT (OUTPATIENT)
Dept: NEUROLOGY | Facility: CLINIC | Age: 63
End: 2018-07-02

## 2018-07-02 ENCOUNTER — TELEPHONE (OUTPATIENT)
Dept: NEUROLOGY | Facility: CLINIC | Age: 63
End: 2018-07-02

## 2018-07-02 VITALS
SYSTOLIC BLOOD PRESSURE: 114 MMHG | BODY MASS INDEX: 27 KG/M2 | DIASTOLIC BLOOD PRESSURE: 76 MMHG | HEART RATE: 80 BPM | RESPIRATION RATE: 16 BRPM | WEIGHT: 156 LBS

## 2018-07-02 DIAGNOSIS — G43.009 MIGRAINE WITHOUT AURA AND WITHOUT STATUS MIGRAINOSUS, NOT INTRACTABLE: ICD-10-CM

## 2018-07-02 DIAGNOSIS — M48.062 LUMBAR STENOSIS WITH NEUROGENIC CLAUDICATION: ICD-10-CM

## 2018-07-02 DIAGNOSIS — M54.16 LUMBAR RADICULITIS: Primary | ICD-10-CM

## 2018-07-02 PROCEDURE — 99214 OFFICE O/P EST MOD 30 MIN: CPT | Performed by: OTHER

## 2018-07-02 RX ORDER — SUMATRIPTAN 50 MG/1
TABLET, FILM COATED ORAL
Qty: 9 TABLET | Refills: 0 | Status: SHIPPED | OUTPATIENT
Start: 2018-07-02 | End: 2018-08-17

## 2018-07-02 RX ORDER — DIVALPROEX SODIUM 500 MG/1
500 TABLET, DELAYED RELEASE ORAL NIGHTLY
Qty: 30 TABLET | Refills: 1 | Status: SHIPPED | OUTPATIENT
Start: 2018-07-02 | End: 2018-10-22 | Stop reason: ALTCHOICE

## 2018-07-02 NOTE — PROGRESS NOTES
[facility] Neurology outpatient progress note  Date of service: 7/2/2018    Patient here to follow up regarding migraines and headaches.  States she has had prolonged 1 migraine since last visit but headache overall seems better with depakote at current dose, she Rfl: 1  •  Levothyroxine Sodium 50 MCG Oral Tab, TAKE 1 BY MOUTH BEFORE BREAKFAST, Disp: 90 tablet, Rfl: 3  •  Lovastatin 40 MG Oral Tab, TAKE 1 BY MOUTH DAILY WITH DINNER, Disp: 90 tablet, Rfl: 3  •  LISINOPRIL 10 MG Oral Tab, TAKE 1 BY MOUTH DAILY, Disp: GID & LOCLZJ NDL/CATH SPI DX/THER VMP      Comment: Procedure: THORACIC EPIDURAL;  Surgeon:                Cori Mclean MD;  Location: [place] [address] MANAGEMENT  5/30/2014: INJECTION, ANESTHETIC/STEROID, TRANSFORAMINAL * Right      C PAIN MANAGEMENT  1/30/2014: EL[facility] BY  [facility]      Comment: Procedure: THORACIC EPIDURAL;  Surgeon:                Jacqueline Samson MD;  Location: [address] [place] [address] Drive MANAGEMENT  2/27/2014: ELÍAS BY  PHYS [id number] [facility] [address] [facility] Father    • Psychiatric Daughter      panic attacks, depression, anorexic   • Other [OTHER] Daughter    • migraine ha [OTHER] Daughter      Neurological Examination:  /76   Pulse 80   Resp 16   Wt 156 lb   BMI 26.78 kg/m²   Mental status: A & O X 3

## 2018-07-02 NOTE — PROGRESS NOTES
Patient here to follow up regarding migraines and headaches. States she has had 1 migraine since last visit but headaches are the same. Here to discuss the next steps.

## 2018-07-02 NOTE — PATIENT INSTRUCTIONS
Refill policies:    • Allow 2-3 business days for refills; controlled substances may take longer.   • Contact your pharmacy at least 5 days prior to running out of medication and have them send an electronic request or submit request through the “request re entire amount billed. Precertification and Prior Authorizations: If your physician has recommended that you have a procedure or additional testing performed.   [facility]) will contact your insurance carrier to obtain pre-certi

## 2018-07-03 NOTE — TELEPHONE ENCOUNTER
Discussed Dr. Kenna Rivera recommendations at [facility] yesterday with pt. Verbalized understanding. Number to neurosurgery office provided.

## 2018-07-03 NOTE — TELEPHONE ENCOUNTER
Per LOV yesterday with Dr. Sergio Valdez:  A/P:   Lumbar disc disease with stenosis   Migraine without aura and without status migrainosus, not intractable  Medication overuse headache     Her headache is a combination of uncontrolled migraine and rebound headache

## 2018-07-03 NOTE — TELEPHONE ENCOUNTER
Attempted to call patient back. She did not answer and no VM currently set up, so could not leave a message. Will have to try again later.

## 2018-07-09 RX ORDER — METHOCARBAMOL 750 MG/1
TABLET, FILM COATED ORAL
Qty: 90 TABLET | Refills: 0 | Status: SHIPPED | OUTPATIENT
Start: 2018-07-09 | End: 2018-07-23

## 2018-07-09 NOTE — TELEPHONE ENCOUNTER
Rx for Depakote 500 mg was sent to [address] [place] [address]. Current request from [facility]    Need to verify with patient where she would like Rx to go to.

## 2018-07-09 NOTE — TELEPHONE ENCOUNTER
METHOCARBAMOL 750MG TABLETS  Will file in chart as: METHOCARBAMOL 750 MG Oral Tab  TAKE 1 TABLET BY MOUTH FOUR TIMES DAILY AS NEEDED       Disp: 90 tablet Refills: 0    Class: Normal Start: 7/8/2018   Documented:2 years ago  Last refill: 4/12/2018  LOV: 5/

## 2018-07-09 NOTE — TELEPHONE ENCOUNTER
Medication: Depakote 500 mg     Date of last refill: 7/2/18, # 30 tabs , 1 refill   [place]    Last office visit: 7/2/2018  Due back to clinic per last office note: None noted   Date next office visit scheduled:   Nicole

## 2018-07-10 NOTE — TELEPHONE ENCOUNTER
Spoke to patient to find out what pharmacy she would like to use. Patient stated that [facility]. I informed her that we sent over a new script on 7/2/18 to [facility] pharmacy. Patient verbalized understanding of information given.  I also informed p

## 2018-07-11 RX ORDER — DIVALPROEX SODIUM 500 MG/1
TABLET, DELAYED RELEASE ORAL
Qty: 60 TABLET | Refills: 0 | OUTPATIENT
Start: 2018-07-11

## 2018-07-13 NOTE — TELEPHONE ENCOUNTER
Received a notice from [employer] dated 7/5/18. They are still waiting for copies of bills. Spoke to [address] with [employer], their records show copies of bills were sent on 6/12/18 and payment was received on 6/25/18.   Tried to contact Jelani

## 2018-07-13 NOTE — TELEPHONE ENCOUNTER
Faxed request for copies of bills back to [employer] with note stating information below. Fax receipt confirmed. Copy sent to [employer].

## 2018-07-23 NOTE — TELEPHONE ENCOUNTER
Medication(s) to Refill: Methocarbamol 750mg Disp 90  Pending Prescriptions    Reason for Medication Refill being sent to Provider / reason Protocol  Failed:  · Non-Protocol Medication    Last time Medication was Filled: 4/11/18 # 80     Last office Visit

## 2018-07-24 RX ORDER — METHOCARBAMOL 750 MG/1
TABLET, FILM COATED ORAL
Qty: 90 TABLET | Refills: 0 | Status: SHIPPED | OUTPATIENT
Start: 2018-07-24 | End: 2018-07-27

## 2018-07-27 ENCOUNTER — TELEPHONE (OUTPATIENT)
Dept: FAMILY MEDICINE CLINIC | Facility: CLINIC | Age: 63
End: 2018-07-27

## 2018-07-27 RX ORDER — METHOCARBAMOL 750 MG/1
TABLET, FILM COATED ORAL
Qty: 30 TABLET | Refills: 0 | Status: SHIPPED | OUTPATIENT
Start: 2018-07-27 | End: 2018-08-18

## 2018-07-27 NOTE — TELEPHONE ENCOUNTER
Methocarbamol 750mg is on back order at [facility].  Pt called [employer] and requested they call us to send a 30 day supply to: [address], [address] [facility], [phone]

## 2018-07-30 ENCOUNTER — HOSPITAL ENCOUNTER (OUTPATIENT)
Dept: GENERAL RADIOLOGY | Facility: HOSPITAL | Age: 63
Discharge: HOME | End: 2018-07-30
Attending: PHYSICIAN ASSISTANT
Payer: COMMERCIAL

## 2018-07-30 ENCOUNTER — OFFICE VISIT (OUTPATIENT)
Dept: SURGERY | Facility: CLINIC | Age: 63
End: 2018-07-30

## 2018-07-30 VITALS — HEART RATE: 88 BPM | DIASTOLIC BLOOD PRESSURE: 70 MMHG | SYSTOLIC BLOOD PRESSURE: 120 MMHG

## 2018-07-30 DIAGNOSIS — M54.41 MIDLINE LOW BACK PAIN WITH RIGHT-SIDED SCIATICA, UNSPECIFIED CHRONICITY: Primary | ICD-10-CM

## 2018-07-30 DIAGNOSIS — M54.41 MIDLINE LOW BACK PAIN WITH RIGHT-SIDED SCIATICA, UNSPECIFIED CHRONICITY: ICD-10-CM

## 2018-07-30 DIAGNOSIS — M51.36 DDD (DEGENERATIVE DISC DISEASE), LUMBAR: ICD-10-CM

## 2018-07-30 PROCEDURE — 72114 X-RAY EXAM L-S SPINE BENDING: CPT | Performed by: PHYSICIAN ASSISTANT

## 2018-07-30 PROCEDURE — 72082 X-RAY EXAM ENTIRE SPI 2/3 VW: CPT | Performed by: PHYSICIAN ASSISTANT

## 2018-07-30 PROCEDURE — 99204 OFFICE O/P NEW MOD 45 MIN: CPT | Performed by: PHYSICIAN ASSISTANT

## 2018-07-30 RX ORDER — AMITRIPTYLINE HYDROCHLORIDE 50 MG/1
50 TABLET, FILM COATED ORAL NIGHTLY
COMMUNITY
End: 2018-08-18

## 2018-07-30 NOTE — H&P
Neurosurgery Clinic Visit  2018    Ines Wren PCP:  Zoila Matson DO    1955 MRN [id number]       CC:  Back Pain    HPI:    Joanna Tong is a very pleasant 66year old female who presents with chronic lower back and buttock pain.   Symptoms star versus partial volume averaging with adjacent fluid-filled bowel. Follow-up CT abdomen/pelvis is advised, as renal neoplasm cannot be excluded.     Past Medical History:   Diagnosis Date   • Allergic rhinitis, cause unspecified    • Anxiety    • Back proble Mother      depression   • Hypertension Mother    • Diabetes Mother    • Other [place]o [place] Mother    • Breast Cancer Other      P 1st cousin- dx age 39   • Hypertension Father    • Other [OTHER] Father    • dementia [OTHER] Father    • Psychiatric Daughter 2. DDD (degenerative disc disease), lumbar        Lumbar radiculopathic pain is resolved. Still experiencing chronic lower back and buttock pain.   Discussed lumbar fusion as last resort, reviewed multiple levels of advancing degenerative disease in the

## 2018-07-30 NOTE — PATIENT INSTRUCTIONS
Refill policies:    • Allow 2-3 business days for refills; controlled substances may take longer.   • Contact your pharmacy at least 5 days prior to running out of medication and have them send an electronic request or submit request through the “request re entire amount billed. Precertification and Prior Authorizations: If your physician has recommended that you have a procedure or additional testing performed.   [facility]) will contact your insurance carrier to obtain pre-certi

## 2018-07-30 NOTE — PROGRESS NOTES
Location of Pain: lower back pain, buttock on both sides, radiating down the  of the right leg stopping above the ankle    Date Pain Began: yrs          Work Related:   No        Receiving Work Comp/Disability:   No    Numeric Rating Scale:  [facility]

## 2018-08-06 ENCOUNTER — TELEPHONE (OUTPATIENT)
Dept: FAMILY MEDICINE CLINIC | Facility: CLINIC | Age: 63
End: 2018-08-06

## 2018-08-06 DIAGNOSIS — R51.9 ACUTE INTRACTABLE HEADACHE, UNSPECIFIED HEADACHE TYPE: Primary | ICD-10-CM

## 2018-08-06 RX ORDER — CYCLOBENZAPRINE HCL 10 MG
10 TABLET ORAL 3 TIMES DAILY PRN
Qty: 30 TABLET | Refills: 1 | Status: SHIPPED | OUTPATIENT
Start: 2018-08-06 | End: 2018-08-07

## 2018-08-06 NOTE — TELEPHONE ENCOUNTER
Pt states the methocarbamol 750 MG Oral Tabis on backorder and needs different muscle relaxer called in

## 2018-08-06 NOTE — TELEPHONE ENCOUNTER
Called and confirmed with [facility] Correia is on manufacture back order. Is there an alternative medication you would like to try at this time?

## 2018-08-07 RX ORDER — CYCLOBENZAPRINE HCL 10 MG
10 TABLET ORAL 3 TIMES DAILY PRN
Qty: 30 TABLET | Refills: 1 | Status: SHIPPED | OUTPATIENT
Start: 2018-08-07 | End: 2018-10-22 | Stop reason: ALTCHOICE

## 2018-08-07 NOTE — TELEPHONE ENCOUNTER
Patient notified, verbalized understanding. Med was sent to mail order, patient requested to local. Resent to patient preferred pharmacy. Patient also asking for referral to Dr. Lucille Quevedo. Referral for additional visits placed.

## 2018-08-09 PROBLEM — M53.3 SI (SACROILIAC) PAIN: Status: ACTIVE | Noted: 2018-08-09

## 2018-08-16 ENCOUNTER — LAB ENCOUNTER (OUTPATIENT)
Dept: LAB | Age: 63
End: 2018-08-16
Attending: FAMILY MEDICINE
Payer: COMMERCIAL

## 2018-08-16 DIAGNOSIS — Z00.00 LABORATORY EXAM ORDERED AS PART OF ROUTINE GENERAL MEDICAL EXAMINATION: ICD-10-CM

## 2018-08-16 LAB
ALBUMIN SERPL-MCNC: 3.8 G/DL (ref 3.5–4.8)
ALBUMIN/GLOB SERPL: 1 {RATIO} (ref 1–2)
ALP LIVER SERPL-CCNC: 57 U/L (ref 50–130)
ALT SERPL-CCNC: 24 U/L (ref 14–54)
ANION GAP SERPL CALC-SCNC: 6 MMOL/L (ref 0–18)
AST SERPL-CCNC: 24 U/L (ref 15–41)
BASOPHILS # BLD AUTO: 0.03 X10(3) UL (ref 0–0.1)
BASOPHILS NFR BLD AUTO: 0.6 %
BILIRUB SERPL-MCNC: 0.3 MG/DL (ref 0.1–2)
BUN BLD-MCNC: 21 MG/DL (ref 8–20)
BUN/CREAT SERPL: 22.8 (ref 10–20)
CALCIUM BLD-MCNC: 9.7 MG/DL (ref 8.3–10.3)
CHLORIDE SERPL-SCNC: 104 MMOL/L (ref 101–111)
CHOLEST SMN-MCNC: 174 MG/DL (ref ?–200)
CO2 SERPL-SCNC: 30 MMOL/L (ref 22–32)
CREAT BLD-MCNC: 0.92 MG/DL (ref 0.55–1.02)
EOSINOPHIL # BLD AUTO: 0.15 X10(3) UL (ref 0–0.3)
EOSINOPHIL NFR BLD AUTO: 3 %
ERYTHROCYTE [DISTWIDTH] IN BLOOD BY AUTOMATED COUNT: 12.5 % (ref 11.5–16)
GLOBULIN PLAS-MCNC: 3.7 G/DL (ref 2.5–4)
GLUCOSE BLD-MCNC: 88 MG/DL (ref 70–99)
HCT VFR BLD AUTO: 41.2 % (ref 34–50)
HDLC SERPL-MCNC: 50 MG/DL (ref 40–59)
HGB BLD-MCNC: 13.3 G/DL (ref 12–16)
IMMATURE GRANULOCYTE COUNT: 0.01 X10(3) UL (ref 0–1)
IMMATURE GRANULOCYTE RATIO %: 0.2 %
LDLC SERPL CALC-MCNC: 97 MG/DL (ref ?–100)
LYMPHOCYTES # BLD AUTO: 1.66 X10(3) UL (ref 0.9–4)
LYMPHOCYTES NFR BLD AUTO: 33.3 %
M PROTEIN MFR SERPL ELPH: 7.5 G/DL (ref 6.1–8.3)
MCH RBC QN AUTO: 30.3 PG (ref 27–33.2)
MCHC RBC AUTO-ENTMCNC: 32.3 G/DL (ref 31–37)
MCV RBC AUTO: 93.8 FL (ref 81–100)
MONOCYTES # BLD AUTO: 0.49 X10(3) UL (ref 0.1–1)
MONOCYTES NFR BLD AUTO: 9.8 %
NEUTROPHIL ABS PRELIM: 2.65 X10 (3) UL (ref 1.3–6.7)
NEUTROPHILS # BLD AUTO: 2.65 X10(3) UL (ref 1.3–6.7)
NEUTROPHILS NFR BLD AUTO: 53.1 %
NONHDLC SERPL-MCNC: 124 MG/DL (ref ?–130)
OSMOLALITY SERPL CALC.SUM OF ELEC: 292 MOSM/KG (ref 275–295)
PLATELET # BLD AUTO: 303 10(3)UL (ref 150–450)
POTASSIUM SERPL-SCNC: 5 MMOL/L (ref 3.6–5.1)
RBC # BLD AUTO: 4.39 X10(6)UL (ref 3.8–5.1)
RED CELL DISTRIBUTION WIDTH-SD: 43.7 FL (ref 35.1–46.3)
SODIUM SERPL-SCNC: 140 MMOL/L (ref 136–144)
TRIGL SERPL-MCNC: 133 MG/DL (ref 30–149)
TSI SER-ACNC: 2.76 MIU/ML (ref 0.35–5.5)
VLDLC SERPL CALC-MCNC: 27 MG/DL (ref 0–30)
WBC # BLD AUTO: 5 X10(3) UL (ref 4–13)

## 2018-08-16 PROCEDURE — 80053 COMPREHEN METABOLIC PANEL: CPT

## 2018-08-16 PROCEDURE — 84443 ASSAY THYROID STIM HORMONE: CPT

## 2018-08-16 PROCEDURE — 36415 COLL VENOUS BLD VENIPUNCTURE: CPT

## 2018-08-16 PROCEDURE — 80061 LIPID PANEL: CPT

## 2018-08-16 PROCEDURE — 85025 COMPLETE CBC W/AUTO DIFF WBC: CPT

## 2018-08-17 DIAGNOSIS — G43.009 MIGRAINE WITHOUT AURA AND WITHOUT STATUS MIGRAINOSUS, NOT INTRACTABLE: ICD-10-CM

## 2018-08-17 RX ORDER — SUMATRIPTAN 50 MG/1
TABLET, FILM COATED ORAL
Qty: 9 TABLET | Refills: 0 | Status: SHIPPED | OUTPATIENT
Start: 2018-08-17 | End: 2018-08-18

## 2018-08-17 NOTE — TELEPHONE ENCOUNTER
Medication: Imitex    Date of last refill: 7/2/18 for #9/0 additional refills  [place]    Last office visit: 7/2/18  Due back to clinic per last office note:  not stated, pt was considering transferring to Dr. Elisa Hernandez

## 2018-08-18 ENCOUNTER — OFFICE VISIT (OUTPATIENT)
Dept: FAMILY MEDICINE CLINIC | Facility: CLINIC | Age: 63
End: 2018-08-18

## 2018-08-18 VITALS
WEIGHT: 157 LBS | TEMPERATURE: 99 F | RESPIRATION RATE: 16 BRPM | HEIGHT: 64 IN | HEART RATE: 80 BPM | SYSTOLIC BLOOD PRESSURE: 120 MMHG | BODY MASS INDEX: 26.8 KG/M2 | DIASTOLIC BLOOD PRESSURE: 80 MMHG

## 2018-08-18 DIAGNOSIS — E03.9 ACQUIRED HYPOTHYROIDISM: ICD-10-CM

## 2018-08-18 DIAGNOSIS — K21.9 GASTROESOPHAGEAL REFLUX DISEASE, ESOPHAGITIS PRESENCE NOT SPECIFIED: ICD-10-CM

## 2018-08-18 DIAGNOSIS — G43.009 MIGRAINE WITHOUT AURA AND WITHOUT STATUS MIGRAINOSUS, NOT INTRACTABLE: ICD-10-CM

## 2018-08-18 DIAGNOSIS — I10 ESSENTIAL HYPERTENSION: ICD-10-CM

## 2018-08-18 DIAGNOSIS — F41.9 ANXIETY: ICD-10-CM

## 2018-08-18 DIAGNOSIS — Z00.00 ANNUAL PHYSICAL EXAM: Primary | ICD-10-CM

## 2018-08-18 DIAGNOSIS — E78.2 MIXED HYPERLIPIDEMIA: ICD-10-CM

## 2018-08-18 DIAGNOSIS — Z12.31 SCREENING MAMMOGRAM, ENCOUNTER FOR: ICD-10-CM

## 2018-08-18 PROCEDURE — 99213 OFFICE O/P EST LOW 20 MIN: CPT | Performed by: FAMILY MEDICINE

## 2018-08-18 PROCEDURE — 99396 PREV VISIT EST AGE 40-64: CPT | Performed by: FAMILY MEDICINE

## 2018-08-18 PROCEDURE — G0438 PPPS, INITIAL VISIT: HCPCS | Performed by: FAMILY MEDICINE

## 2018-08-18 RX ORDER — LORAZEPAM 0.5 MG/1
0.5 TABLET ORAL NIGHTLY PRN
Qty: 30 TABLET | Refills: 2 | Status: SHIPPED | OUTPATIENT
Start: 2018-08-18 | End: 2018-12-19

## 2018-08-18 RX ORDER — LEVOTHYROXINE SODIUM 0.05 MG/1
TABLET ORAL
Qty: 90 TABLET | Refills: 3 | Status: SHIPPED | OUTPATIENT
Start: 2018-08-18 | End: 2019-11-03

## 2018-08-18 RX ORDER — OMEPRAZOLE 40 MG/1
40 CAPSULE, DELAYED RELEASE ORAL 2 TIMES DAILY
Qty: 180 CAPSULE | Refills: 1 | Status: SHIPPED | OUTPATIENT
Start: 2018-08-18 | End: 2018-08-20

## 2018-08-18 RX ORDER — SUMATRIPTAN 50 MG/1
TABLET, FILM COATED ORAL
Qty: 9 TABLET | Refills: 5 | Status: SHIPPED | OUTPATIENT
Start: 2018-08-18 | End: 2019-01-28

## 2018-08-18 RX ORDER — METHOCARBAMOL 750 MG/1
TABLET, FILM COATED ORAL
Qty: 60 TABLET | Refills: 3 | Status: SHIPPED | OUTPATIENT
Start: 2018-08-18 | End: 2019-02-16

## 2018-08-18 RX ORDER — AMITRIPTYLINE HYDROCHLORIDE 50 MG/1
50 TABLET, FILM COATED ORAL NIGHTLY
Qty: 90 TABLET | Refills: 1 | Status: SHIPPED | OUTPATIENT
Start: 2018-08-18 | End: 2019-01-14

## 2018-08-18 RX ORDER — LOVASTATIN 40 MG/1
TABLET ORAL
Qty: 90 TABLET | Refills: 3 | Status: SHIPPED | OUTPATIENT
Start: 2018-08-18

## 2018-08-18 RX ORDER — LISINOPRIL 10 MG/1
10 TABLET ORAL DAILY
Qty: 90 TABLET | Refills: 1 | Status: SHIPPED | OUTPATIENT
Start: 2018-08-18 | End: 2019-02-17
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
## 2018-08-18 NOTE — PATIENT INSTRUCTIONS
Increase omeprazole to 40 mg 2 times per day for 1-2 months, follow up if not improving, may have to consider Endoscopy.   Continue diet and lifestyle modifications for prevention    Schedule mammogram and heart scan  Follow up in 6 months       Preventio All women should be familiar with the potential benefits and risks of breast cancer screening with mammograms.      Cervical cancer All women in this age group, except women who have had a complete hysterectomy Pap test every 3 years or Pap test with human Hepatitis A Women at increased risk for infection – talk with your healthcare provider 2 doses given at least 6 months apart   Hepatitis B Women at increased risk for infection – talk with your healthcare provider 3 doses over 6 months; second dose should Use of daily aspirin Women ages 54 and up in this age group who are at risk for cardiovascular health problems such as stroke When your risk is known   Use of tobacco and the health effects it can cause All women in this age group Every exam   [address]mer[address] Ca

## 2018-08-20 ENCOUNTER — TELEPHONE (OUTPATIENT)
Dept: FAMILY MEDICINE CLINIC | Facility: CLINIC | Age: 63
End: 2018-08-20

## 2018-08-20 ENCOUNTER — APPOINTMENT (OUTPATIENT)
Dept: PHYSICAL THERAPY | Age: 63
End: 2018-08-20
Attending: PHYSICIAN ASSISTANT
Payer: COMMERCIAL

## 2018-08-20 DIAGNOSIS — K21.9 GASTROESOPHAGEAL REFLUX DISEASE, ESOPHAGITIS PRESENCE NOT SPECIFIED: ICD-10-CM

## 2018-08-20 RX ORDER — OMEPRAZOLE 40 MG/1
40 CAPSULE, DELAYED RELEASE ORAL 2 TIMES DAILY
Qty: 180 CAPSULE | Refills: 1 | Status: SHIPPED | OUTPATIENT
Start: 2018-08-20 | End: 2019-02-17

## 2018-08-20 NOTE — TELEPHONE ENCOUNTER
Omeprazole (Capsule Delayed Release) Omeprazole 40 MG Oral Take 1 capsule (40 mg total) by mouth 2 (two) times daily. TAKE 1 BY MOUTH DAILY     Need clarification on directions.   [facility], [address]

## 2018-08-22 ENCOUNTER — OFFICE VISIT (OUTPATIENT)
Dept: PHYSICAL THERAPY | Age: 63
End: 2018-08-22
Attending: PHYSICIAN ASSISTANT
Payer: COMMERCIAL

## 2018-08-22 PROCEDURE — 97163 PT EVAL HIGH COMPLEX 45 MIN: CPT

## 2018-08-22 PROCEDURE — 97110 THERAPEUTIC EXERCISES: CPT

## 2018-08-23 NOTE — PROGRESS NOTES
SPINE EVALUATION:   Referring Physician: Dr. Dante Levy  Diagnosis: Midline low back pain with right-sided sciatica, unspecified chronicity (M54.41)  DDD (degenerative disc disease), lumbar (M51.36)       Date of Service: 8/22/2018  None made     PATIENT SUM is becoming more limited with walking and day to day household tasks and demonstrates fear avoidance behavior. Eval findings include decreased/painful lumbar/hip ROM, hypomobility and decreased flexibility. Myotomes strong/equal and SLR is (-).   Jewel Base wo from prior PT. Pt owns swiss ball, stationary bike.    Charges: PT Eval High Complexity, TE x 1      Total Timed Treatment: 12 min     Total Treatment Time: 50 min   Based on the clinical presentation, examination and history, this evaluation shows involvem

## 2018-08-27 ENCOUNTER — OFFICE VISIT (OUTPATIENT)
Dept: PHYSICAL THERAPY | Age: 63
End: 2018-08-27
Attending: PHYSICIAN ASSISTANT
Payer: COMMERCIAL

## 2018-08-27 PROCEDURE — 97110 THERAPEUTIC EXERCISES: CPT

## 2018-08-27 PROCEDURE — 97140 MANUAL THERAPY 1/> REGIONS: CPT

## 2018-08-27 PROCEDURE — 97112 NEUROMUSCULAR REEDUCATION: CPT

## 2018-08-27 NOTE — PROGRESS NOTES
Dx: Midline low back pain with right-sided sciatica, unspecified chronicity (M54.41)  DDD (degenerative disc disease), lumbar (M51.36)           Authorized # of Visits:  8         Next MD visit:  Ulysses Brewer after PT.    Possible second SI injection DMG next with assist, instructed variations         R lumbar rot mobe grade II 30\" x4         Prone STM T-L paraspinals         CP CP x10'         Skilled Services: pt education, neck/back care, exercise instruction and cueing for form.      Charges: Calvin 1 NR x 1 m

## 2018-08-29 ENCOUNTER — OFFICE VISIT (OUTPATIENT)
Dept: PHYSICAL THERAPY | Age: 63
End: 2018-08-29
Attending: PHYSICIAN ASSISTANT
Payer: COMMERCIAL

## 2018-08-29 PROCEDURE — 97140 MANUAL THERAPY 1/> REGIONS: CPT

## 2018-08-29 PROCEDURE — 97110 THERAPEUTIC EXERCISES: CPT

## 2018-08-29 NOTE — PROGRESS NOTES
Dx: Midline low back pain with right-sided sciatica, unspecified chronicity (M54.41)  DDD (degenerative disc disease), lumbar (M51.36)           Authorized # of Visits:  8         Next MD visit:  Karlene Torres after PT.    Possible second SI injection DMG next trunk rot 5x each LE's on SB  -DKTC  -bridge 10x        LTR Lumbar rot mobe grade II R and L 30\" x4        Piriformis stretch with assist, instructed variations Prone STM R lower thoracic, T-L junction        R lumbar rot mobe grade II 30\" x4 4 point 3 w

## 2018-09-04 ENCOUNTER — OFFICE VISIT (OUTPATIENT)
Dept: NEUROLOGY | Facility: CLINIC | Age: 63
End: 2018-09-04

## 2018-09-04 ENCOUNTER — TELEPHONE (OUTPATIENT)
Dept: NEUROLOGY | Facility: CLINIC | Age: 63
End: 2018-09-04

## 2018-09-04 VITALS
DIASTOLIC BLOOD PRESSURE: 80 MMHG | WEIGHT: 153 LBS | SYSTOLIC BLOOD PRESSURE: 120 MMHG | BODY MASS INDEX: 26 KG/M2 | HEART RATE: 64 BPM

## 2018-09-04 DIAGNOSIS — G44.86 CERVICOGENIC HEADACHE: ICD-10-CM

## 2018-09-04 DIAGNOSIS — G43.009 MIGRAINE WITHOUT AURA AND WITHOUT STATUS MIGRAINOSUS, NOT INTRACTABLE: Primary | ICD-10-CM

## 2018-09-04 PROCEDURE — 99215 OFFICE O/P EST HI 40 MIN: CPT | Performed by: OTHER

## 2018-09-04 RX ORDER — DIVALPROEX SODIUM 500 MG/1
500 TABLET, DELAYED RELEASE ORAL NIGHTLY
Qty: 30 TABLET | Refills: 2 | Status: CANCELLED | OUTPATIENT
Start: 2018-09-04

## 2018-09-04 NOTE — PROGRESS NOTES
Patient here to follow up for migraines. States she continues to have headaches daily and migraines 2-3 days a week.

## 2018-09-04 NOTE — TELEPHONE ENCOUNTER
Patient seen in office today. Per Dr Derrick NEWSOME for Botox for migraines     Botox referral placed.

## 2018-09-04 NOTE — PROGRESS NOTES
[facility] Neurology outpatient progress note  Date of service: 9/4/2018    Patient here to follow up regarding migraines and headaches. States she continues to have headaches daily and migraines ~3 days a week.   She has self stopped depakote since it didn't seem 3 (three) times daily as needed for Muscle spasms. , Disp: 30 tablet, Rfl: 1  •  hydrocortisone 2.5 % External Cream, Apply to AA BID x 2-3 weeks. , Disp: 30 g, Rfl: 2  •  divalproex Sodium 500 MG Oral Tab EC DR tab, Take 1 tablet (500 mg total) by mouth nig FLUOR GID & MARIA R NDL/CATH SPI DX/THER BKT      Comment: Procedure: THORACIC EPIDURAL;  Surgeon:                Oj Briceno MD;  Location: [place] [address] MANAGEMENT  2/27/2014: [address] NDL/CATH SPI DX/THER [address] PAIN MANAGEMENT  2/27/2014: INJECTION, W/WO CONTRAST, DX/THERAPEUTIC SUBST*      Comment: Procedure: THORACIC EPIDURAL;  Surgeon:                Michael Lazo MD;  Location: [place] [address] MANAGEMENT  1/30/2014: EL[facility]AS BY  PHYS PER Packs/day  For 0.50 Years     Quit date: 5/1/1973    Smokeless tobacco: Never Used    Alcohol use Yes 5 Glasses of wine per week    Comment: 2 wine/wk     Family History   Problem Relation Age of Onset   • Psychiatric Mother      depression   • Hypertensio advised  NEUROSURGERY follow up advised  May consider pain management to do injection  Migraine education given  Medication overuse headache education given  PCP, spine surgeon follow up  See orders and medications filed with this encounter.  The patient in

## 2018-09-04 NOTE — PATIENT INSTRUCTIONS
Refill policies:    • Allow 2-3 business days for refills; controlled substances may take longer.   • Contact your pharmacy at least 5 days prior to running out of medication and have them send an electronic request or submit request through the “request re entire amount billed. Precertification and Prior Authorizations: If your physician has recommended that you have a procedure or additional testing performed.   [facility]) will contact your insurance carrier to obtain pre-certi

## 2018-09-05 ENCOUNTER — OFFICE VISIT (OUTPATIENT)
Dept: PHYSICAL THERAPY | Age: 63
End: 2018-09-05
Attending: PHYSICIAN ASSISTANT
Payer: COMMERCIAL

## 2018-09-05 PROCEDURE — 97140 MANUAL THERAPY 1/> REGIONS: CPT

## 2018-09-05 PROCEDURE — 97110 THERAPEUTIC EXERCISES: CPT

## 2018-09-05 NOTE — PROGRESS NOTES
Dx: Midline low back pain with right-sided sciatica, unspecified chronicity (M54.41)  DDD (degenerative disc disease), lumbar (M51.36)           Authorized # of Visits:  8         Next MD visit:  Louisa Parker after PT.    Possible second SI injection DMG next board  AP  ML  20x each       Reverse T wall slide 5x Sitting trunk rot in flex and in neutral 3x each Green TB  -lat walk 1 L each       Doorway pect stretch 3x Standing sidebend 3x each Gentle Manual distraction/  SOR       Sitting trunk rot 5x each LE's

## 2018-09-06 PROBLEM — Z98.1 S/P CERVICAL SPINAL FUSION: Status: ACTIVE | Noted: 2018-09-06

## 2018-09-06 PROBLEM — M54.12 CERVICAL RADICULITIS: Status: ACTIVE | Noted: 2018-09-06

## 2018-09-06 PROBLEM — M48.02 CERVICAL STENOSIS OF SPINAL CANAL: Status: ACTIVE | Noted: 2018-09-06

## 2018-09-07 NOTE — TELEPHONE ENCOUNTER
Started referral with [facility] for codes ,[address] 4 visits per year.     Pending referral #[id number]

## 2018-09-10 ENCOUNTER — OFFICE VISIT (OUTPATIENT)
Dept: PHYSICAL THERAPY | Age: 63
End: 2018-09-10
Attending: PHYSICIAN ASSISTANT
Payer: COMMERCIAL

## 2018-09-10 PROCEDURE — 97110 THERAPEUTIC EXERCISES: CPT

## 2018-09-10 PROCEDURE — 97140 MANUAL THERAPY 1/> REGIONS: CPT

## 2018-09-10 NOTE — PROGRESS NOTES
Dx: Midline low back pain with right-sided sciatica, unspecified chronicity (M54.41)  DDD (degenerative disc disease), lumbar (M51.36)           Authorized # of Visits:  8         Next MD visit:  Terra Meza after PT.    Possible second SI injection DMG next rot in flex and in neutral 3x each Green TB  -lat walk 1 L each Shuttle  -DL 2-25 lbs  -SL 1-25 lb 15x2 each  Ab brace      Doorway pect stretch 3x Standing sidebend 3x each Gentle Manual distraction/  SOR Foam roll  -Y pect stretch  -snow angle 10x      S

## 2018-09-10 NOTE — TELEPHONE ENCOUNTER
REFERRAL APPROVED FOR 4 VISITS FROM 9/4/18-12/31/18      PLEASE SCHEDULE PATIENT FOR HER FIRST BOTOX APPOINTMENT IN [place]. PLEASE ALLOW FOR TIME TO ORDER MEDICATION. PLEASE LET ME KNOW THE DATE.

## 2018-09-12 ENCOUNTER — OFFICE VISIT (OUTPATIENT)
Dept: PHYSICAL THERAPY | Age: 63
End: 2018-09-12
Attending: PHYSICIAN ASSISTANT
Payer: COMMERCIAL

## 2018-09-12 PROCEDURE — 97140 MANUAL THERAPY 1/> REGIONS: CPT

## 2018-09-12 PROCEDURE — 97112 NEUROMUSCULAR REEDUCATION: CPT

## 2018-09-12 PROCEDURE — 97110 THERAPEUTIC EXERCISES: CPT

## 2018-09-12 NOTE — PROGRESS NOTES
Dx: Midline low back pain with right-sided sciatica, unspecified chronicity (M54.41)  DDD (degenerative disc disease), lumbar (M51.36)           Authorized # of Visits:  8         Next MD visit:  Francisco Temple after PT.    Possible second SI injection DMG next ext  10x2     Reverse T wall slide 5x Sitting trunk rot in flex and in neutral 3x each Green TB  -lat walk 1 L each Shuttle  -DL 2-25 lbs  -SL 1-25 lb 15x2 each  Ab brace Shuttle  -DL 2-25 lbs  -SL 1-25 lb 15x2 each  Ab brace     Doorway pect stretch 3x St

## 2018-09-13 ENCOUNTER — HOSPITAL ENCOUNTER (OUTPATIENT)
Dept: CT IMAGING | Age: 63
Discharge: HOME | End: 2018-09-13
Attending: FAMILY MEDICINE

## 2018-09-13 DIAGNOSIS — Z13.6 SCREENING FOR HEART DISEASE: ICD-10-CM

## 2018-09-17 ENCOUNTER — APPOINTMENT (OUTPATIENT)
Dept: PHYSICAL THERAPY | Age: 63
End: 2018-09-17
Attending: PHYSICIAN ASSISTANT
Payer: COMMERCIAL

## 2018-09-17 DIAGNOSIS — R91.1 PULMONARY NODULE: Primary | ICD-10-CM

## 2018-09-20 DIAGNOSIS — F41.9 ANXIETY: ICD-10-CM

## 2018-09-21 RX ORDER — LORAZEPAM 0.5 MG/1
TABLET ORAL
Qty: 30 TABLET | Refills: 1 | OUTPATIENT
Start: 2018-09-21

## 2018-09-21 NOTE — TELEPHONE ENCOUNTER
Requesting Lorazepam  LOV: 8/18/18  RTC: 6 months  Last Relevant Labs: n/a  Filled: 8/18/18 #30 with 2 refills    2/16/2019 11:00 AM Laurita Quinonez, DO [facility] [address] [facility] [address]     I called [facility] to verify last filled date. [place]   Os

## 2018-09-25 ENCOUNTER — HOSPITAL ENCOUNTER (OUTPATIENT)
Dept: CT IMAGING | Age: 63
Discharge: HOME | End: 2018-09-25
Attending: FAMILY MEDICINE
Payer: COMMERCIAL

## 2018-09-25 DIAGNOSIS — R91.1 PULMONARY NODULE: ICD-10-CM

## 2018-09-25 PROCEDURE — 71250 CT THORAX DX C-: CPT | Performed by: FAMILY MEDICINE

## 2018-09-26 ENCOUNTER — OFFICE VISIT (OUTPATIENT)
Dept: PHYSICAL THERAPY | Age: 63
End: 2018-09-26
Attending: PHYSICIAN ASSISTANT
Payer: COMMERCIAL

## 2018-09-26 DIAGNOSIS — R91.1 PULMONARY NODULE: Primary | ICD-10-CM

## 2018-09-26 PROCEDURE — 97110 THERAPEUTIC EXERCISES: CPT

## 2018-09-26 PROCEDURE — 97140 MANUAL THERAPY 1/> REGIONS: CPT

## 2018-09-26 NOTE — PROGRESS NOTES
Dx: Midline low back pain with right-sided sciatica, unspecified chronicity (M54.41)  DDD (degenerative disc disease), lumbar (M51.36)           Authorized # of Visits:  8         Next MD visit:  Ulysses Brewer after PT.      Fall Risk: standard         Precauti walk 1 L each Shuttle  -DL 2-25 lbs  -SL 1-25 lb 15x2 each  Ab brace Shuttle  -DL 2-25 lbs  -SL 1-25 lb 15x2 each  Ab brace Red TB Lat walk 2 L    Doorway pect stretch 3x Standing sidebend 3x each Gentle Manual distraction/  SOR Foam roll  -Y pect stretch

## 2018-09-28 ENCOUNTER — OFFICE VISIT (OUTPATIENT)
Dept: PHYSICAL THERAPY | Age: 63
End: 2018-09-28
Attending: FAMILY MEDICINE
Payer: COMMERCIAL

## 2018-09-28 PROCEDURE — 97110 THERAPEUTIC EXERCISES: CPT

## 2018-09-28 PROCEDURE — 97140 MANUAL THERAPY 1/> REGIONS: CPT

## 2018-09-28 NOTE — PROGRESS NOTES
Dx: Midline low back pain with right-sided sciatica, unspecified chronicity (M54.41)  DDD (degenerative disc disease), lumbar (M51.36)           Authorized # of Visits:  8         Next MD visit:  Leonila Bell after PT.     ANGELAG  2 weeks injections follow up tolerance. She has a long history of back and neck pain, progress is slow. She presents with muscular tenderness/tightness, core/hip weakness, spinal hypomobility. Excellent follow through with HEP.   She would benefit from continued PT x 8 visits to ad abd 10x2  -hip ext  10x2 Red TB    -hip ext  10x2 Red TB   -hip ext 20x each     Reverse T wall slide 5x Sitting trunk rot in flex and in neutral 3x each Green TB  -lat walk 1 L each Shuttle  -DL 2-25 lbs  -SL 1-25 lb 15x2 each  Ab brace Shuttle  -DL 2-25

## 2018-10-01 ENCOUNTER — TELEPHONE (OUTPATIENT)
Dept: PHYSICAL THERAPY | Age: 63
End: 2018-10-01

## 2018-10-01 ENCOUNTER — TELEPHONE (OUTPATIENT)
Dept: SURGERY | Facility: CLINIC | Age: 63
End: 2018-10-01

## 2018-10-01 DIAGNOSIS — M54.50 MIDLINE LOW BACK PAIN WITHOUT SCIATICA, UNSPECIFIED CHRONICITY: Primary | ICD-10-CM

## 2018-10-01 DIAGNOSIS — M51.36 DDD (DEGENERATIVE DISC DISEASE), LUMBAR: ICD-10-CM

## 2018-10-02 ENCOUNTER — HOSPITAL ENCOUNTER (OUTPATIENT)
Dept: MAMMOGRAPHY | Age: 63
Discharge: HOME | End: 2018-10-02
Attending: FAMILY MEDICINE
Payer: COMMERCIAL

## 2018-10-02 DIAGNOSIS — Z12.31 SCREENING MAMMOGRAM, ENCOUNTER FOR: ICD-10-CM

## 2018-10-02 PROCEDURE — 77063 BREAST TOMOSYNTHESIS BI: CPT | Performed by: FAMILY MEDICINE

## 2018-10-02 PROCEDURE — 77067 SCR MAMMO BI INCL CAD: CPT | Performed by: FAMILY MEDICINE

## 2018-10-03 ENCOUNTER — OFFICE VISIT (OUTPATIENT)
Dept: PHYSICAL THERAPY | Age: 63
End: 2018-10-03
Attending: FAMILY MEDICINE
Payer: COMMERCIAL

## 2018-10-03 PROCEDURE — 97140 MANUAL THERAPY 1/> REGIONS: CPT

## 2018-10-03 PROCEDURE — 97110 THERAPEUTIC EXERCISES: CPT

## 2018-10-03 PROCEDURE — 97112 NEUROMUSCULAR REEDUCATION: CPT

## 2018-10-03 NOTE — PROGRESS NOTES
Dx: Midline low back pain with right-sided sciatica, unspecified chronicity (M54.41)  DDD (degenerative disc disease), lumbar (M51.36)           Authorized # of Visits:  8  + 10/3/18 additional 8       Next MD visit:  Fredo Godwin after PT.        Fall Risk: s additional visits. Cont with exercises progressions, neuromuscular training and manual therapy as pt follows up with Pain Management/injections.           Date: 8/27/2018  Tx#: 2/8 Date: 8/29/18  Tx#: 3/8 Date: 9/5/18  Tx#: 4/ Date: 9/10/18  Tx#: 5/ Date: ITB roller Modified plank on knees 5\" hold     R lumbar rot mobe grade II 30\" x4 4 point 3 way stretch CP x10' Mid thoracic to upper lumbar PA's grade II Facing chair training form with squat 5x CP x10' Prone STM R lower thoracic, T-L junction Prone STM

## 2018-10-08 ENCOUNTER — IMMUNIZATION (OUTPATIENT)
Dept: FAMILY MEDICINE CLINIC | Facility: CLINIC | Age: 63
End: 2018-10-08

## 2018-10-08 DIAGNOSIS — Z23 NEED FOR VACCINATION: ICD-10-CM

## 2018-10-08 PROCEDURE — 90471 IMMUNIZATION ADMIN: CPT | Performed by: FAMILY MEDICINE

## 2018-10-08 PROCEDURE — 90686 IIV4 VACC NO PRSV 0.5 ML IM: CPT | Performed by: FAMILY MEDICINE

## 2018-10-15 ENCOUNTER — OFFICE VISIT (OUTPATIENT)
Dept: PHYSICAL THERAPY | Age: 63
End: 2018-10-15
Attending: FAMILY MEDICINE
Payer: COMMERCIAL

## 2018-10-15 PROCEDURE — 97140 MANUAL THERAPY 1/> REGIONS: CPT

## 2018-10-15 PROCEDURE — 97112 NEUROMUSCULAR REEDUCATION: CPT

## 2018-10-15 PROCEDURE — 97110 THERAPEUTIC EXERCISES: CPT

## 2018-10-15 NOTE — PROGRESS NOTES
Dx: Midline low back pain with right-sided sciatica, unspecified chronicity (M54.41)  DDD (degenerative disc disease), lumbar (M51.36)           Authorized # of Visits:  8  + 10/3/18 additional 8       Next MD visit:  10/22//2018       Fall Risk: standard 20    Reverse T wall slide 5x Sitting trunk rot in flex and in neutral 3x each Green TB  -lat walk 1 L each Shuttle  -DL 2-25 lbs  -SL 1-25 lb 15x2 each  Ab brace Shuttle  -DL 2-25 lbs  -SL 1-25 lb 15x2 each  Ab brace Red TB Lat walk 2 L Red TB  -hip abd 2 without hold 10x          CP 10'        Skilled Services: pt education/pain science, neck/back care, exercise instruction and cueing for form.      Charges: Calvin 1 NR x 1 man x 1  Total Timed Treatment: 43 min     Total Treatment Time: 53 min

## 2018-10-17 ENCOUNTER — OFFICE VISIT (OUTPATIENT)
Dept: PHYSICAL THERAPY | Age: 63
End: 2018-10-17
Attending: FAMILY MEDICINE
Payer: COMMERCIAL

## 2018-10-17 PROCEDURE — 97112 NEUROMUSCULAR REEDUCATION: CPT

## 2018-10-17 PROCEDURE — 97110 THERAPEUTIC EXERCISES: CPT

## 2018-10-17 PROCEDURE — 97140 MANUAL THERAPY 1/> REGIONS: CPT

## 2018-10-17 NOTE — PROGRESS NOTES
Dx: Midline low back pain with right-sided sciatica, unspecified chronicity (M54.41)  DDD (degenerative disc disease), lumbar (M51.36)           Authorized # of Visits:  8  + 10/3/18 additional 8       Next MD visit:  10/22//2018       Fall Risk: standard each  Ab brace Shuttle  -DL 2-25 lbs  -SL 1-25 lb 15x2 each  Ab brace Red TB Lat walk 2 L Red TB  -hip abd 20x each modified range Red TB  -hip abd 20x each modified range X 20 Standing squat 10x   Doorway pect stretch 3x Standing sidebend 3x each Gentle M 10 min CP x10'    CP x10'    Prone STM R lower thoracic, T-L junction  Standing squat without hold 10x          CP 10'        Skilled Services: pt education/pain science, neck/back care, exercise instruction and cueing for form.      Charges: Calvin 1 NR x 1 m

## 2018-10-22 ENCOUNTER — OFFICE VISIT (OUTPATIENT)
Dept: NEUROLOGY | Facility: CLINIC | Age: 63
End: 2018-10-22

## 2018-10-22 ENCOUNTER — OFFICE VISIT (OUTPATIENT)
Dept: PHYSICAL THERAPY | Age: 63
End: 2018-10-22
Attending: FAMILY MEDICINE
Payer: COMMERCIAL

## 2018-10-22 VITALS — SYSTOLIC BLOOD PRESSURE: 122 MMHG | DIASTOLIC BLOOD PRESSURE: 70 MMHG | HEART RATE: 86 BPM | RESPIRATION RATE: 16 BRPM

## 2018-10-22 DIAGNOSIS — G43.009 MIGRAINE WITHOUT AURA AND WITHOUT STATUS MIGRAINOSUS, NOT INTRACTABLE: Primary | ICD-10-CM

## 2018-10-22 PROCEDURE — 99214 OFFICE O/P EST MOD 30 MIN: CPT | Performed by: OTHER

## 2018-10-22 PROCEDURE — 64615 CHEMODENERV MUSC MIGRAINE: CPT | Performed by: OTHER

## 2018-10-22 PROCEDURE — 97140 MANUAL THERAPY 1/> REGIONS: CPT

## 2018-10-22 PROCEDURE — 97112 NEUROMUSCULAR REEDUCATION: CPT

## 2018-10-22 PROCEDURE — 97110 THERAPEUTIC EXERCISES: CPT

## 2018-10-22 NOTE — PATIENT INSTRUCTIONS
Refill policies:    • Allow 2-3 business days for refills; controlled substances may take longer.   • Contact your pharmacy at least 5 days prior to running out of medication and have them send an electronic request or submit request through the “request re entire amount billed. Precertification and Prior Authorizations: If your physician has recommended that you have a procedure or additional testing performed.   [facility]) will contact your insurance carrier to obtain pre-certi insurance carrier gives the disclaimer that \"Although the procedure is authorized, this does not guarantee payment. \"    Ultimately the patient is responsible for payment.

## 2018-10-22 NOTE — PROCEDURES
Date of service: 10/22/2018  Procedure: Botox injection -chemodenervation  Consent: obtained after explanation of procedure detail, benefits and risks    Indication:   -chronic migraine patient who suffers 15 or more days with headache lasting 4 hours a da DO

## 2018-10-22 NOTE — PROGRESS NOTES
Dx: Midline low back pain with right-sided sciatica, unspecified chronicity (M54.41)  DDD (degenerative disc disease), lumbar (M51.36)           Authorized # of Visits:  8  + 10/3/18 additional 8       Next MD visit:  10/22//2018       Fall Risk: standard TB  -lat walk 1 L each Shuttle  -DL 2-25 lbs  -SL 1-25 lb 15x2 each  Ab brace Shuttle  -DL 2-25 lbs  -SL 1-25 lb 15x2 each  Ab brace Red TB Lat walk 2 L Red TB  -hip abd 20x each modified range Red TB  -hip abd 20x each modified range X 20 Standing squat 1 Services: pt education/pain science, neck/back care, exercise instruction and cueing for form.      Charges: Calvin 1 NR x 1 man x 1  Total Timed Treatment: 43 min     Total Treatment Time: 53 min

## 2018-10-24 ENCOUNTER — TELEPHONE (OUTPATIENT)
Dept: NEUROLOGY | Facility: CLINIC | Age: 63
End: 2018-10-24

## 2018-10-24 ENCOUNTER — OFFICE VISIT (OUTPATIENT)
Dept: PHYSICAL THERAPY | Age: 63
End: 2018-10-24
Attending: FAMILY MEDICINE
Payer: COMMERCIAL

## 2018-10-24 PROCEDURE — 97140 MANUAL THERAPY 1/> REGIONS: CPT

## 2018-10-24 PROCEDURE — 97112 NEUROMUSCULAR REEDUCATION: CPT

## 2018-10-24 PROCEDURE — 97110 THERAPEUTIC EXERCISES: CPT

## 2018-10-24 NOTE — TELEPHONE ENCOUNTER
This is not a reaction to botox. If severe, pt can go ER or come to office for toradol/decadron injection.

## 2018-10-24 NOTE — PROGRESS NOTES
Dx: Midline low back pain with right-sided sciatica, unspecified chronicity (M54.41)  DDD (degenerative disc disease), lumbar (M51.36)           Authorized # of Visits:  8  + 10/3/18 additional 8       Next MD visit:  10/28//2018 neck injections.        Analia Salas 10/18/18  11/16 10/22/2018  Tx: 12/16 10/24/18  13/16    Seated stepper x7' x6' x6' TM x5' x5 recumb bike x5' Recumbent stepper X 5 min 5' X 5 min x5'    Rocker board  AP  ML  20x each Rocker board  AP  ML  20x each Red TB  -hip abd 10x2  -hip ext  10x2 Re R lower thoracic, T-L junction    Thoracic PA's grade I, II Prone STM B mid and lower thoarcic, T-L junction    Grade II central PA thoracic spine Prone STM B mid and lower thoarcic, T-L junction    Grade II central PA thoracic spine Prone STM B mid and lo

## 2018-10-24 NOTE — TELEPHONE ENCOUNTER
Patient states she received first Botox 10/22/18. That evening started with head heaviness which progressed to full migraine. States she took total of 2 Imitrex and migraine abated around 4 am yesterday. Woke up today with migraine again.  Has taken one Imi

## 2018-10-24 NOTE — TELEPHONE ENCOUNTER
Attempted to contact patient to notify,however phone line was cutting in and out so unable to relay providers recommendations. Called back twice with same outcome.      3rd time contacted patient and left a detailed VM(ok per HIPPA) to notify of provide

## 2018-10-25 NOTE — ED PROVIDER NOTES
Patient Seen in: [facility] [place]    History   Patient presents with:  Headache (neurologic)    Stated Complaint: headache since 1 month    HPI    59-year-old female with a history of endometriosis, history of hypertension, hyper Procedure: THORACIC EPIDURAL;  Surgeon:                Adrian Triplett MD;  Location: [address] [place] [address] Drive MANAGEMENT  2/27/2014: Berto RIVERA & Gisela Jackson NDL/CATH SPI DX/THER D[facility]      Comment: Procedure: THORACIC EPIDURAL;  Surgeon: DX/THERAPEUTIC SUBST*      Comment: Procedure: THORACIC EPIDURAL;  Surgeon:                Emmy Lopez MD;  Location: [place] [address] MANAGEMENT  1/30/2014: [facility] BY [facility] 5+ YR      Comment: Procedure: THORACIC EPIDU complaint: headache since 1 month  Other systems are as noted in HPI. Constitutional and vital signs reviewed. All other systems reviewed and negative except as noted above.     Physical Exam   ED Triage Vitals [02/02/18 1323]  BP: 148/74  Pulse: 68 WITH DIFFERENTIAL WITH PLATELET.   Procedure                               Abnormality         Status                     ---------                               -----------         ------                     CBC W/ DIFFERENTIAL[[id number]] here in the emergency department. Patient remained stable throughout the emergency department observation period. Findings  of the patient's tests results were discussed with the patient in detail.   They were understanding of these results and their di 2

## 2018-10-29 NOTE — TELEPHONE ENCOUNTER
LM on VM (ok per HIPAA) with Dr. Pj Suarez recommendations again to make sure she got it. If she would like to discuss further and/or is interested in Toradol/Decadron injections can call office to schedule and to update office if needed.

## 2018-11-05 ENCOUNTER — OFFICE VISIT (OUTPATIENT)
Dept: PHYSICAL THERAPY | Age: 63
End: 2018-11-05
Attending: FAMILY MEDICINE
Payer: COMMERCIAL

## 2018-11-05 PROCEDURE — 97112 NEUROMUSCULAR REEDUCATION: CPT

## 2018-11-05 PROCEDURE — 97110 THERAPEUTIC EXERCISES: CPT

## 2018-11-05 PROCEDURE — 97140 MANUAL THERAPY 1/> REGIONS: CPT

## 2018-11-05 NOTE — PROGRESS NOTES
Dx: Midline low back pain with right-sided sciatica, unspecified chronicity (M54.41)  DDD (degenerative disc disease), lumbar (M51.36)           Authorized # of Visits:  8  + 10/3/18 additional 8       Next MD visit:  10/28//2018 neck injections.        Marcin Nieto 10/3/18  9/16 10/15/2018  Tx: 10/16 10/18/18  11/16 10/22/2018  Tx: 12/16 10/24/18  13/16 11/5/18  14/16   Seated stepper x7' x6' x6' TM x5' x5 recumb bike x5' Recumbent stepper X 5 min 5' X 5 min x5' 5'   Rocker board  AP  ML  20x each Rocker board  AP  M on knees 5\" hold - Head nod into pillow 5x    pect minor, UT  Release 3' X 5         CP x 10 min Prone PA's grade II, III T-L junction   CP x10' Mid thoracic to upper lumbar PA's grade II Facing chair training form with squat 5x CP x10' Prone STM R lower

## 2018-11-12 ENCOUNTER — OFFICE VISIT (OUTPATIENT)
Dept: PHYSICAL THERAPY | Age: 63
End: 2018-11-12
Attending: FAMILY MEDICINE
Payer: COMMERCIAL

## 2018-11-12 PROCEDURE — 97140 MANUAL THERAPY 1/> REGIONS: CPT

## 2018-11-12 PROCEDURE — 97112 NEUROMUSCULAR REEDUCATION: CPT

## 2018-11-12 PROCEDURE — 97110 THERAPEUTIC EXERCISES: CPT

## 2018-11-12 NOTE — PROGRESS NOTES
Dx: Midline low back pain with right-sided sciatica, unspecified chronicity (M54.41)  DDD (degenerative disc disease), lumbar (M51.36)           Authorized # of Visits:  8  + 10/3/18 additional 8       Next MD visit:  12/3/18 cervical.    SI next week. 10/22/2018  Tx: 12/16 10/24/18  13/16 11/5/18  14/16 11/1/2/18  15/16   x6' TM x5' x5 recumb bike x5' Recumbent stepper X 5 min 5' X 5 min x5' 5' x5'   Red TB  -hip abd 10x2  -hip ext  10x2 Red TB    -hip ext  10x2 Red TB   -hip ext 20x each   Red TB   -hi T-L junction    Thoracic PA's grade I, II Prone STM B mid and lower thoarcic, T-L junction    Grade II central PA thoracic spine Prone STM B mid and lower thoarcic, T-L junction    Grade II central PA thoracic spine Prone STM B mid and lower thoracic, T-L

## 2018-11-13 ENCOUNTER — APPOINTMENT (OUTPATIENT)
Dept: PHYSICAL THERAPY | Age: 63
End: 2018-11-13
Payer: COMMERCIAL

## 2018-11-26 ENCOUNTER — OFFICE VISIT (OUTPATIENT)
Dept: PHYSICAL THERAPY | Age: 63
End: 2018-11-26
Attending: FAMILY MEDICINE
Payer: COMMERCIAL

## 2018-11-26 PROCEDURE — 97112 NEUROMUSCULAR REEDUCATION: CPT

## 2018-11-26 PROCEDURE — 97140 MANUAL THERAPY 1/> REGIONS: CPT

## 2018-11-26 PROCEDURE — 97110 THERAPEUTIC EXERCISES: CPT

## 2018-11-26 NOTE — PROGRESS NOTES
Dx: Midline low back pain with right-sided sciatica, unspecified chronicity (M54.41)  DDD (degenerative disc disease), lumbar (M51.36)           Authorized # of Visits:  8  + 10/3/18 additional 8       Next MD visit:  12/3/18 cervical.    SI next week. pain. --> partially met due to pain  Improved lumbar flexion to enable don/doff socks and shoes without increased complaints. -->  In progress  Able to tolerate walking 4 blocks with decreased pain to 2/10 levels.  --> progressing    Plan:  Pt has completed into pillow 5x    pect minor, UT  Release 3' X 5         CP x 10 min Prone PA's grade II, III T-L junction Prone PA's grade II, III T-L junction SB instruction/ pt owns SB  -wall sit, DKTC, bridge, prayer stretch  -written handouts   Prone STM B mid and lo

## 2018-12-10 ENCOUNTER — TELEPHONE (OUTPATIENT)
Dept: SURGERY | Facility: CLINIC | Age: 63
End: 2018-12-10

## 2018-12-10 NOTE — TELEPHONE ENCOUNTER
Received req for billing records. Sent to Monica in [facility]. Copy sent to scanning.

## 2018-12-14 ENCOUNTER — NURSE ONLY (OUTPATIENT)
Dept: NEUROLOGY | Facility: CLINIC | Age: 63
End: 2018-12-14

## 2018-12-14 ENCOUNTER — TELEPHONE (OUTPATIENT)
Dept: NEUROLOGY | Facility: CLINIC | Age: 63
End: 2018-12-14

## 2018-12-14 DIAGNOSIS — G43.009 MIGRAINE WITHOUT AURA AND WITHOUT STATUS MIGRAINOSUS, NOT INTRACTABLE: Primary | ICD-10-CM

## 2018-12-14 PROCEDURE — 96372 THER/PROPH/DIAG INJ SC/IM: CPT | Performed by: OTHER

## 2018-12-14 RX ORDER — DEXAMETHASONE SODIUM PHOSPHATE 10 MG/ML
10 INJECTION, SOLUTION INTRAMUSCULAR; INTRAVENOUS ONCE
Status: COMPLETED | OUTPATIENT
Start: 2018-12-14 | End: 2018-12-14

## 2018-12-14 RX ORDER — KETOROLAC TROMETHAMINE 30 MG/ML
30 INJECTION, SOLUTION INTRAMUSCULAR; INTRAVENOUS ONCE
Status: COMPLETED | OUTPATIENT
Start: 2018-12-14 | End: 2018-12-14

## 2018-12-14 RX ADMIN — KETOROLAC TROMETHAMINE 30 MG: 30 INJECTION, SOLUTION INTRAMUSCULAR; INTRAVENOUS at 10:39:00

## 2018-12-14 RX ADMIN — DEXAMETHASONE SODIUM PHOSPHATE 10 MG: 10 INJECTION, SOLUTION INTRAMUSCULAR; INTRAVENOUS at 10:35:00

## 2018-12-14 NOTE — PROGRESS NOTES
Patient in office for injection for current 3 day migraine. See TE 10/24/2018. Toradol 30mg and Decadron 10mg IM injections given per VORB. See MAR for admin details. Consent form signed prior to injections. Pt tolerated well.

## 2018-12-14 NOTE — TELEPHONE ENCOUNTER
S-pt calling with 3 day migraine. Wants to come in for Toradol/Decadron injection. B-sees Dr. Alexsander Altamirano for her migraines, is receiving Botox. Also on Amitriptyline. A-migraine started 3 days ago. Comes from her neck and radiates to the front of head.  Carolyn Hinds

## 2018-12-19 DIAGNOSIS — F41.9 ANXIETY: ICD-10-CM

## 2018-12-19 DIAGNOSIS — G43.009 MIGRAINE WITHOUT AURA AND WITHOUT STATUS MIGRAINOSUS, NOT INTRACTABLE: ICD-10-CM

## 2018-12-19 RX ORDER — LORAZEPAM 0.5 MG/1
TABLET ORAL
Qty: 30 TABLET | Refills: 1 | Status: SHIPPED | OUTPATIENT
Start: 2018-12-19 | End: 2019-02-21

## 2018-12-19 NOTE — TELEPHONE ENCOUNTER
Medication(s) to Refill:   Requested Prescriptions     Pending Prescriptions Disp Refills   • LORAZEPAM 0.5 MG Oral Tab [Pharmacy Med Name: LORazepam Oral Tablet 0.5 MG] 30 tablet 1     Sig: TAKE ONE TABLET BY MOUTH NIGHTLY AS NEEDED.          Reason for Me

## 2018-12-20 RX ORDER — SUMATRIPTAN 50 MG/1
TABLET, FILM COATED ORAL
Qty: 9 TABLET | Refills: 0 | Status: SHIPPED | OUTPATIENT
Start: 2018-12-20

## 2018-12-20 NOTE — TELEPHONE ENCOUNTER
Medication: SUMATRIPTAN SUCCINATE 50 MG Oral Tab    Date of last refill: 08/18/18 (#9/5)  [place]    Last office visit: 10/22/2018  Due back to clinic per last office note:  3 months  Date next office visit scheduled

## 2019-01-02 ENCOUNTER — TELEPHONE (OUTPATIENT)
Dept: FAMILY MEDICINE CLINIC | Facility: CLINIC | Age: 64
End: 2019-01-02

## 2019-01-02 DIAGNOSIS — Z92.89 HISTORY OF USE OF HEARING AID: Primary | ICD-10-CM

## 2019-01-02 DIAGNOSIS — Z97.4 WEARS HEARING AID: Primary | ICD-10-CM

## 2019-01-02 NOTE — TELEPHONE ENCOUNTER
Pt is asking for a referral for Debi Rivas, in Audiology at [facility].

## 2019-01-02 NOTE — TELEPHONE ENCOUNTER
[place] called back letting our office know that the Referral Type is incorrect. It should not say ENT.  It needs to to be to Audiology     (Referral #[id number])    Audiology Fax Number: [phone]

## 2019-01-14 DIAGNOSIS — G43.009 MIGRAINE WITHOUT AURA AND WITHOUT STATUS MIGRAINOSUS, NOT INTRACTABLE: ICD-10-CM

## 2019-01-15 RX ORDER — AMITRIPTYLINE HYDROCHLORIDE 50 MG/1
TABLET, FILM COATED ORAL
Qty: 90 TABLET | Refills: 1 | Status: SHIPPED | OUTPATIENT
Start: 2019-01-15 | End: 2019-02-16

## 2019-01-15 NOTE — TELEPHONE ENCOUNTER
Medication(s) to Refill:   Requested Prescriptions     Pending Prescriptions Disp Refills   • AMITRIPTYLINE HCL 50 MG Oral Tab [Pharmacy Med Name: AMITRIPTYLINE 50MG TABLETS] 90 tablet 0     Sig: TAKE 1 TABLET BY MOUTH NIGHTLY         Reason for Medication

## 2019-01-22 DIAGNOSIS — G43.009 MIGRAINE WITHOUT AURA AND WITHOUT STATUS MIGRAINOSUS, NOT INTRACTABLE: ICD-10-CM

## 2019-01-22 RX ORDER — AMITRIPTYLINE HYDROCHLORIDE 50 MG/1
TABLET, FILM COATED ORAL
Qty: 90 TABLET | Refills: 0 | OUTPATIENT
Start: 2019-01-22

## 2019-01-22 NOTE — TELEPHONE ENCOUNTER
Name from pharmacy: AMITRIPTYLINE 50MG TABLETS         Will file in chart as: AMITRIPTYLINE HCL 50 MG Oral Tab    Sig: TAKE 1 TABLET BY MOUTH NIGHTLY    Disp:  90 tablet    Refills:  0    Start: 1/22/2019    Class: Normal    For: Migraine without aura and

## 2019-01-24 ENCOUNTER — TELEPHONE (OUTPATIENT)
Dept: NEUROLOGY | Facility: CLINIC | Age: 64
End: 2019-01-24

## 2019-01-28 ENCOUNTER — TELEPHONE (OUTPATIENT)
Dept: NEUROLOGY | Facility: CLINIC | Age: 64
End: 2019-01-28

## 2019-01-28 ENCOUNTER — OFFICE VISIT (OUTPATIENT)
Dept: NEUROLOGY | Facility: CLINIC | Age: 64
End: 2019-01-28

## 2019-01-28 VITALS — RESPIRATION RATE: 16 BRPM | HEART RATE: 80 BPM | DIASTOLIC BLOOD PRESSURE: 70 MMHG | SYSTOLIC BLOOD PRESSURE: 112 MMHG

## 2019-01-28 DIAGNOSIS — G43.009 MIGRAINE WITHOUT AURA AND WITHOUT STATUS MIGRAINOSUS, NOT INTRACTABLE: Primary | ICD-10-CM

## 2019-01-28 PROCEDURE — 64615 CHEMODENERV MUSC MIGRAINE: CPT | Performed by: OTHER

## 2019-01-28 PROCEDURE — 99214 OFFICE O/P EST MOD 30 MIN: CPT | Performed by: OTHER

## 2019-01-28 NOTE — PATIENT INSTRUCTIONS
Refill policies:    • Allow 2-3 business days for refills; controlled substances may take longer.   • Contact your pharmacy at least 5 days prior to running out of medication and have them send an electronic request or submit request through the “request re been approved by your insurer. Depending on your insurance carrier, approval may take 3-10 days. It is highly recommended patients contact their insurance carrier directly to determine coverage.   If test is done without insurance authorization, patient ma procedure(s) performed in clinic today signed prior to proceeding with procedure(s). Furthermore, patient notified that they should contact their insurer to verify that your procedure/test has been approved and is a COVERED benefit.   Although the [address]

## 2019-01-28 NOTE — PROCEDURES
Date of service: 1/28/2019  Procedure: Botox injection -chemodenervation  Consent: obtained after explanation of procedure detail, benefits and risks    Indication:   -chronic migraine patient who suffers 15 or more days with headache lasting 4 hours a day DO

## 2019-01-28 NOTE — PROGRESS NOTES
[facility] Neurology outpatient progress note  Date of service: 1/28/2019    Patient here to follow up regarding migraines and headaches. States headache has improved with botox injection and possibly neck injection by her pain management.  we had long discussion Disp: 90 tablet, Rfl: 3  •  lisinopril 10 MG Oral Tab, Take 1 tablet (10 mg total) by mouth daily. , Disp: 90 tablet, Rfl: 1  •  Ergocalciferol (VITAMIN D OR), Take by mouth daily.  Unsure of dosage, Disp: , Rfl:   •  Omega-3 Fatty Acids (FISH OIL) 500 MG Or 10/29/2018    Performed by Charyl Hatchet, MD at [address] N/A 9/24/2018    Performed by Charyl Hatchet, MD at [address]   • COLONOSCOPY N/A 12/11/2014    Performed by Florrie Nageotte, MD a Bilateral 8/20/2018    Performed by Marylee Parry, MD at [address] [facility]  05/2014    ACDF 4 level C3-C7   • THORACIC EPIDURAL N/A 2/27/2014    Performed by Brooke Rome MD at [address] [place] [address] on 1/28/2019    A/P:   Migraine without aura and without status migrainosus, not intractable  (primary encounter diagnosis): improving  Cervicogenic headache  Lumbar disc disease with stenosis   Medication overuse headache     Her headache is a combination

## 2019-02-13 PROBLEM — M47.812 FACET ARTHROPATHY, CERVICAL: Status: ACTIVE | Noted: 2019-02-13

## 2019-02-15 ENCOUNTER — TELEPHONE (OUTPATIENT)
Dept: FAMILY MEDICINE CLINIC | Facility: CLINIC | Age: 64
End: 2019-02-15

## 2019-02-15 DIAGNOSIS — M54.9 BACK PAIN, UNSPECIFIED BACK LOCATION, UNSPECIFIED BACK PAIN LATERALITY, UNSPECIFIED CHRONICITY: Primary | ICD-10-CM

## 2019-02-15 NOTE — TELEPHONE ENCOUNTER
Pt seeing Pain management Dr Brenda Goldmann, had mbnb#2 on 2/13/19  Impression:  reduction in pain from 6/10 to 1/10 on VAS and able to increase activity with MBNB bilat C3-6 #1 on 1/23/19 though only lasting 5 hours.  Discussed repeat injection and pt wishes to

## 2019-02-15 NOTE — TELEPHONE ENCOUNTER
referral   Received:  Today   Message Contents   [place] [facility] Clinical Staff   Cc: [facility] Referral Pool   Phone Number: [phone]             .Reason for the order/referral: referral   PCP:  Dr Terry Left   Refer to Provider (first and

## 2019-02-16 ENCOUNTER — OFFICE VISIT (OUTPATIENT)
Dept: FAMILY MEDICINE CLINIC | Facility: CLINIC | Age: 64
End: 2019-02-16

## 2019-02-16 ENCOUNTER — APPOINTMENT (OUTPATIENT)
Dept: LAB | Age: 64
End: 2019-02-16
Attending: FAMILY MEDICINE
Payer: COMMERCIAL

## 2019-02-16 VITALS
WEIGHT: 157 LBS | SYSTOLIC BLOOD PRESSURE: 134 MMHG | HEIGHT: 64 IN | RESPIRATION RATE: 16 BRPM | DIASTOLIC BLOOD PRESSURE: 82 MMHG | BODY MASS INDEX: 26.8 KG/M2 | TEMPERATURE: 98 F | HEART RATE: 72 BPM

## 2019-02-16 DIAGNOSIS — K21.9 GASTROESOPHAGEAL REFLUX DISEASE, ESOPHAGITIS PRESENCE NOT SPECIFIED: ICD-10-CM

## 2019-02-16 DIAGNOSIS — G43.009 MIGRAINE WITHOUT AURA AND WITHOUT STATUS MIGRAINOSUS, NOT INTRACTABLE: Primary | ICD-10-CM

## 2019-02-16 DIAGNOSIS — E78.2 MIXED HYPERLIPIDEMIA: ICD-10-CM

## 2019-02-16 DIAGNOSIS — I10 ESSENTIAL HYPERTENSION: ICD-10-CM

## 2019-02-16 DIAGNOSIS — F51.01 PRIMARY INSOMNIA: ICD-10-CM

## 2019-02-16 DIAGNOSIS — M54.12 CERVICAL RADICULITIS: ICD-10-CM

## 2019-02-16 PROBLEM — M47.812 FACET ARTHROPATHY, CERVICAL: Status: RESOLVED | Noted: 2019-02-13 | Resolved: 2019-02-16

## 2019-02-16 PROBLEM — M48.062 SPINAL STENOSIS OF LUMBAR REGION WITH NEUROGENIC CLAUDICATION: Status: RESOLVED | Noted: 2018-03-26 | Resolved: 2019-02-16

## 2019-02-16 PROBLEM — M48.02 CERVICAL STENOSIS OF SPINAL CANAL: Status: RESOLVED | Noted: 2018-09-06 | Resolved: 2019-02-16

## 2019-02-16 LAB
ALBUMIN SERPL-MCNC: 4 G/DL (ref 3.4–5)
ALBUMIN/GLOB SERPL: 1.1 {RATIO} (ref 1–2)
ALP LIVER SERPL-CCNC: 53 U/L (ref 50–130)
ALT SERPL-CCNC: 28 U/L (ref 13–56)
ANION GAP SERPL CALC-SCNC: 8 MMOL/L (ref 0–18)
AST SERPL-CCNC: 31 U/L (ref 15–37)
BILIRUB SERPL-MCNC: 0.4 MG/DL (ref 0.1–2)
BUN BLD-MCNC: 10 MG/DL (ref 7–18)
BUN/CREAT SERPL: 10.6 (ref 10–20)
CALCIUM BLD-MCNC: 9.5 MG/DL (ref 8.5–10.1)
CHLORIDE SERPL-SCNC: 105 MMOL/L (ref 98–107)
CO2 SERPL-SCNC: 26 MMOL/L (ref 21–32)
CREAT BLD-MCNC: 0.94 MG/DL (ref 0.55–1.02)
GLOBULIN PLAS-MCNC: 3.7 G/DL (ref 2.8–4.4)
GLUCOSE BLD-MCNC: 87 MG/DL (ref 70–99)
M PROTEIN MFR SERPL ELPH: 7.7 G/DL (ref 6.4–8.2)
OSMOLALITY SERPL CALC.SUM OF ELEC: 286 MOSM/KG (ref 275–295)
POTASSIUM SERPL-SCNC: 4.1 MMOL/L (ref 3.5–5.1)
SODIUM SERPL-SCNC: 139 MMOL/L (ref 136–145)

## 2019-02-16 PROCEDURE — 99214 OFFICE O/P EST MOD 30 MIN: CPT | Performed by: FAMILY MEDICINE

## 2019-02-16 PROCEDURE — 80053 COMPREHEN METABOLIC PANEL: CPT

## 2019-02-16 PROCEDURE — 36415 COLL VENOUS BLD VENIPUNCTURE: CPT

## 2019-02-16 RX ORDER — METHOCARBAMOL 750 MG/1
750 TABLET, FILM COATED ORAL 3 TIMES DAILY PRN
Qty: 90 TABLET | Refills: 3 | Status: SHIPPED | OUTPATIENT
Start: 2019-02-16

## 2019-02-16 RX ORDER — AMITRIPTYLINE HYDROCHLORIDE 25 MG/1
25 TABLET, FILM COATED ORAL NIGHTLY
COMMUNITY
End: 2019-02-19

## 2019-02-16 RX ORDER — METHOCARBAMOL 750 MG/1
TABLET, FILM COATED ORAL
Qty: 60 TABLET | Refills: 0 | OUTPATIENT
Start: 2019-02-16

## 2019-02-16 RX ORDER — TRAZODONE HYDROCHLORIDE 50 MG/1
50 TABLET ORAL NIGHTLY
Qty: 30 TABLET | Refills: 0 | Status: SHIPPED | OUTPATIENT
Start: 2019-02-16 | End: 2019-03-06

## 2019-02-16 NOTE — PATIENT INSTRUCTIONS
Trazodone 50 mg nightly for sleep. Call or email in 1-2 weeks to report how dose is working. Treating Insomnia     Learning to relax before bedtime can improve your sleep. Good sleeping habits are a key part of treatment.  If needed, some medicine sleeping environment. · Use a comfortable mattress and pillow. Learn to relax  Stress, anxiety, and body tension may keep you awake at night. To unwind before bedtime, try a warm bath, meditation, or yoga. Also try the following:  · Deep breathing.  Sit o quit, leave the room. · Accept a stressor you can’t change, like a job loss, by knowing that your feelings are normal.  · Alter how you deal with a stressor. If a constantly ringing phone is a stressor, let the answering machine .   · Adapt to some

## 2019-02-16 NOTE — PROGRESS NOTES
HPI:   Karyle Motes is a 66year old female that presents for medication management. Health maintenance up to date. She continues to struggle with chronic migraines and follows with neurologist Dr. Lovely Matos.   Started botox treatments for migraines ru region without myelopathy or radiculopathy      Degeneration of cervical intervertebral disc      Other specified hypothyroidism      Herniation of cervical intervertebral disc with radiculopathy            Overview:             C3-4, 4-5, 5-6, 6-7      St Levothyroxine Sodium 50 MCG Oral Tab, TAKE 1 BY MOUTH BEFORE BREAKFAST, Disp: 90 tablet, Rfl: 3  •  Lovastatin 40 MG Oral Tab, TAKE 1 BY MOUTH DAILY WITH DINNER, Disp: 90 tablet, Rfl: 3  •  Ergocalciferol (VITAMIN D OR), Take by mouth daily.  Unsure of dosa L SHOULDER: full ROM, normal inspection and palpation. negative empty can, negative eddy, negative neers, negative yergason, negative speeds. Normal handgrip. Sensation and strength intact. NEURO:  A&Ox3. Gait stable, coordination intact.   [place] Pretty

## 2019-02-17 PROBLEM — F51.01 PRIMARY INSOMNIA: Status: ACTIVE | Noted: 2019-02-17

## 2019-02-17 RX ORDER — LISINOPRIL 10 MG/1
10 TABLET ORAL DAILY
Qty: 90 TABLET | Refills: 1 | Status: SHIPPED | OUTPATIENT
Start: 2019-02-17

## 2019-02-17 RX ORDER — OMEPRAZOLE 40 MG/1
40 CAPSULE, DELAYED RELEASE ORAL 2 TIMES DAILY
Qty: 180 CAPSULE | Refills: 1 | Status: SHIPPED | OUTPATIENT
Start: 2019-02-17

## 2019-02-19 DIAGNOSIS — G43.009 MIGRAINE WITHOUT AURA AND WITHOUT STATUS MIGRAINOSUS, NOT INTRACTABLE: Primary | ICD-10-CM

## 2019-02-19 RX ORDER — AMITRIPTYLINE HYDROCHLORIDE 10 MG/1
TABLET, FILM COATED ORAL
Qty: 7 TABLET | Refills: 0 | Status: SHIPPED | OUTPATIENT
Start: 2019-02-19 | End: 2019-03-06

## 2019-02-19 NOTE — TELEPHONE ENCOUNTER
Patient states she has decreased Amitriptyline to 25 mg q hs per night for two weeks. Patient states she is weaning off Amitriptyline and she requested Rx for 10 mg tabs and instructions to finish weaning off medication.  Verified patient's preferred pharm

## 2019-02-20 ENCOUNTER — HOSPITAL ENCOUNTER (OUTPATIENT)
Dept: MRI IMAGING | Age: 64
Discharge: HOME | End: 2019-02-20
Attending: PAIN MEDICINE
Payer: COMMERCIAL

## 2019-02-20 DIAGNOSIS — M48.02 CERVICAL STENOSIS OF SPINAL CANAL: ICD-10-CM

## 2019-02-20 DIAGNOSIS — G95.9 CERVICAL MYELOPATHY (HCC): ICD-10-CM

## 2019-02-20 DIAGNOSIS — M47.812 FACET ARTHROPATHY, CERVICAL: ICD-10-CM

## 2019-02-20 DIAGNOSIS — M50.10 HERNIATION OF CERVICAL INTERVERTEBRAL DISC WITH RADICULOPATHY: ICD-10-CM

## 2019-02-20 DIAGNOSIS — M48.02 STENOSIS, CERVICAL SPINE: ICD-10-CM

## 2019-02-20 DIAGNOSIS — M50.30 DEGENERATION OF CERVICAL INTERVERTEBRAL DISC: ICD-10-CM

## 2019-02-20 DIAGNOSIS — M54.12 CERVICAL RADICULITIS: ICD-10-CM

## 2019-02-20 PROCEDURE — 72141 MRI NECK SPINE W/O DYE: CPT | Performed by: PAIN MEDICINE

## 2019-02-21 DIAGNOSIS — F41.9 ANXIETY: ICD-10-CM

## 2019-02-21 RX ORDER — LORAZEPAM 0.5 MG/1
TABLET ORAL
Qty: 30 TABLET | Refills: 5 | Status: SHIPPED
Start: 2019-02-21 | End: 2019-08-28

## 2019-02-21 NOTE — PROGRESS NOTES
No areas of significant stenosis. Having failed CESIs and MBNB, follow up with her surgeon to discuss options, and if none exist, follow up with Dr. Ashlyn Ignacio ([facility]?).

## 2019-02-21 NOTE — TELEPHONE ENCOUNTER
Requesting Lorazepam 0.5mg  LOV: 2/16/19  RTC: 6 mos  Last Labs: n/a  Filled: 12/19/18 #30with 1 refills    Future Appointments   Date Time Provider Og Martinez   2/22/2019 10:00 AM JEROD Cordova [facility] AT [facility] [place]   4/30/2019  2:00 PM Jeanette Wan

## 2019-02-22 ENCOUNTER — OFFICE VISIT (OUTPATIENT)
Dept: SURGERY | Facility: CLINIC | Age: 64
End: 2019-02-22

## 2019-02-22 VITALS — HEART RATE: 70 BPM | SYSTOLIC BLOOD PRESSURE: 132 MMHG | DIASTOLIC BLOOD PRESSURE: 82 MMHG

## 2019-02-22 DIAGNOSIS — Z98.1 S/P CERVICAL SPINAL FUSION: Primary | ICD-10-CM

## 2019-02-22 DIAGNOSIS — M54.12 CERVICAL RADICULOPATHY AT C8: ICD-10-CM

## 2019-02-22 PROCEDURE — 99213 OFFICE O/P EST LOW 20 MIN: CPT | Performed by: PHYSICIAN ASSISTANT

## 2019-02-22 RX ORDER — TRAMADOL HYDROCHLORIDE 50 MG/1
50 TABLET ORAL EVERY 6 HOURS PRN
Qty: 90 TABLET | Refills: 0 | Status: SHIPPED | OUTPATIENT
Start: 2019-02-22 | End: 2019-04-08

## 2019-02-22 NOTE — PROGRESS NOTES
Neurosurgery Clinic Visit  2019    Onetha Sicard PCP:  Angelina Gloria DO    1955 MRN [id number]       CC:  Left Neck/Arm Pain    HPI:    Monie Palmer is here to discuss a flare of her neck and left arm symptoms.   She was previously seen for low b focal weakness. She has been in and out of PT for years with temporary improvement from massage. She has had many injections over the years. She does get temporary improvement with SCOTT.   Most recently she has had a B/L L3-S1 RFA a few months ago which h bilateral neural foraminal stenosis at C3-4 and C7-T1.     4.  No definite focal spinal cord signal abnormality identified.           Past Medical History:   Diagnosis Date   • Allergic rhinitis, cause unspecified     • Anxiety     • Back problem 9-2014 Onset   • Psychiatric Mother         depression   • Hypertension Mother     • Diabetes Mother     • Other Montiel Yuan Mother     • Breast Cancer Other         P 1st cousin- dx age 39   • Hypertension Father     • Other [OTHER] Father     • dementia [OTHER] Fat segment disease at the C7-T1 junction irritating the C8 nerve. She may also have a double crush component from peripheral nerve entrapment. Recommend CT Cervical and XR Cervical flex/ex to eval fusion. F/u after imaging with Dr. Reid Harkins.   She has also b

## 2019-02-22 NOTE — PROGRESS NOTES
Pt is here for follow up for post injections and PT. Pt states that since the end of jan 2019 she had received botox shot into the neck. Pt that she has radiating pain in the left arm.

## 2019-02-25 ENCOUNTER — TELEPHONE (OUTPATIENT)
Dept: FAMILY MEDICINE CLINIC | Facility: CLINIC | Age: 64
End: 2019-02-25

## 2019-02-25 NOTE — TELEPHONE ENCOUNTER
Spoke to patient. She states she is weaning off the amitriptyline 10mg, 3 days left and will be completely off medication. She is currently taking trazodone 50mg daily. Per LOV note, . Primary insomnia  - TraZODone HCl 50 MG Oral Tab;  Take 1 tablet (50

## 2019-02-25 NOTE — TELEPHONE ENCOUNTER
Would increase dose to 100 mg of trazodone, ok to take 2 of current 50 mg tabs, and change rx when she runs out if increased dose working well.       Lizabeth Navas, DO  Family Medicine

## 2019-02-25 NOTE — TELEPHONE ENCOUNTER
Pt informed of new instructions below per . Pt verbalized understanding new recommended dose and will call when she needs new dose to be sent in. All questions answered.

## 2019-02-25 NOTE — TELEPHONE ENCOUNTER
Pt is on trazodone and it only worked for four days and now she is up at night again.  She wants the med changed I stated she would need an appt and pt states that no the doc said she can just call

## 2019-02-28 ENCOUNTER — HOSPITAL ENCOUNTER (OUTPATIENT)
Dept: CT IMAGING | Age: 64
Discharge: HOME | End: 2019-02-28
Attending: PHYSICIAN ASSISTANT
Payer: COMMERCIAL

## 2019-02-28 ENCOUNTER — HOSPITAL ENCOUNTER (OUTPATIENT)
Dept: GENERAL RADIOLOGY | Age: 64
Discharge: HOME | End: 2019-02-28
Attending: PHYSICIAN ASSISTANT
Payer: COMMERCIAL

## 2019-02-28 DIAGNOSIS — M54.12 CERVICAL RADICULOPATHY AT C8: ICD-10-CM

## 2019-02-28 DIAGNOSIS — Z98.1 S/P CERVICAL SPINAL FUSION: ICD-10-CM

## 2019-02-28 PROCEDURE — 72125 CT NECK SPINE W/O DYE: CPT | Performed by: PHYSICIAN ASSISTANT

## 2019-02-28 PROCEDURE — 72052 X-RAY EXAM NECK SPINE 6/>VWS: CPT | Performed by: PHYSICIAN ASSISTANT

## 2019-03-06 ENCOUNTER — OFFICE VISIT (OUTPATIENT)
Dept: SURGERY | Facility: CLINIC | Age: 64
End: 2019-03-06

## 2019-03-06 ENCOUNTER — TELEPHONE (OUTPATIENT)
Dept: SURGERY | Facility: CLINIC | Age: 64
End: 2019-03-06

## 2019-03-06 VITALS — DIASTOLIC BLOOD PRESSURE: 70 MMHG | SYSTOLIC BLOOD PRESSURE: 115 MMHG | HEART RATE: 76 BPM

## 2019-03-06 DIAGNOSIS — Z98.1 S/P CERVICAL SPINAL FUSION: Primary | ICD-10-CM

## 2019-03-06 DIAGNOSIS — M54.12 CERVICAL RADICULOPATHY: Primary | ICD-10-CM

## 2019-03-06 DIAGNOSIS — F51.01 PRIMARY INSOMNIA: ICD-10-CM

## 2019-03-06 PROCEDURE — 99213 OFFICE O/P EST LOW 20 MIN: CPT | Performed by: NEUROLOGICAL SURGERY

## 2019-03-06 RX ORDER — TRAZODONE HYDROCHLORIDE 100 MG/1
100 TABLET ORAL NIGHTLY
Qty: 90 TABLET | Refills: 1 | Status: SHIPPED | OUTPATIENT
Start: 2019-03-06 | End: 2019-03-12

## 2019-03-06 RX ORDER — TRAZODONE HYDROCHLORIDE 50 MG/1
50 TABLET ORAL NIGHTLY
Qty: 30 TABLET | Refills: 0 | OUTPATIENT
Start: 2019-03-06

## 2019-03-06 NOTE — TELEPHONE ENCOUNTER
You are scheduled for Posterior C7-T1 laminectomy on 5/2/2019 with .     Pre-op instructions discussed with patient and surgical packet provided:    · You will need to contact the Pre-admission department at [phone] to schedule your pre-op te will need to contact them for a fitting. We will provide you with the contact information for these vendors. · You may need an external bone growth stimulator.  If ordered for your surgery the vendor, someone from [employer] or [employer] will contact you either be

## 2019-03-06 NOTE — PATIENT INSTRUCTIONS
Refill policies:    • Allow 2-3 business days for refills; controlled substances may take longer.   • Contact your pharmacy at least 5 days prior to running out of medication and have them send an electronic request or submit request through the “request re been approved by your insurer. Depending on your insurance carrier, approval may take 3-10 days. It is highly recommended patients contact their insurance carrier directly to determine coverage.   If test is done without insurance authorization, patient ma . Pre-op instructions discussed with patient and surgical packet provided:    · You will need to contact the Pre-admission department at [phone] to schedule your pre-op testing.   They will get you scheduled for all the blood work, chest x the contact information for these vendors. · You may need an external bone growth stimulator. If ordered for your surgery the vendor, someone from [employer] or [employer] will contact you either before or after your surgery.  (If appropriate)  · If you were on blo

## 2019-03-06 NOTE — PROGRESS NOTES
[facility]  Neurosurgery Clinic Visit      HISTORY OF PRESENT ILLNESS:  Lloyd Bustillos is a(n) 66year old female here for follow-up after imaging and injections. History of C3-7 laminectomies and fusion, did well for several years.  Cheryle Levins • CERVICAL EPIDURAL N/A 9/24/2018    Performed by Angela Bennett MD at [facility]   • CERVICAL FACET INJECTION Left 2/13/2019    Performed by Samantha Lemon MD at [place] Performed by Jo Ann Marsh MD at [address]   • SI JOINT INJECTION Bilateral 9/17/2018    Performed by Jo Ann Marsh MD at [address]   • SI JOINT INJECTION Bilateral 8/20/2018    Performed by Jo Ann Marsh 115/70   Pulse 76   LMP  (LMP Unknown)   GENERAL:  Patient is a 66year old female in no acute distress. NEUROLOGICAL:     Cognition: alert and oriented x 3    Cranial nerves: Pupils equally round and reactive to light. EOMs intact.     Face is symmetrical

## 2019-03-06 NOTE — TELEPHONE ENCOUNTER
Requesting Trazodone 50mg for Primary insomnia  LOV: 2/16/19  RTC: 6 mos  Last Labs: n/a  Filled: 2/16/19 #30 with 0 refills    Future Appointments   Date Time Provider Og Martinez   3/6/2019 11:30 AM MD CHERELLE Reynolds [facility] [place]

## 2019-03-06 NOTE — TELEPHONE ENCOUNTER
Patient increased trazodone to 100 mg nightly, will send new rx, please make sure patient knows new tabs will be 100 mg tabs (was taking 2 of the 50 mg tablets).      Janeth Burch, DO  Family Medicine

## 2019-03-06 NOTE — PROGRESS NOTES
Pt is here for follow up for post imaging. CT of the cervical and Xray of the cervical.     Pt states that she is still the same since LOV.      Pain scale: 7/10

## 2019-03-08 PROBLEM — M47.812 ARTHROPATHY OF CERVICAL SPINE: Status: ACTIVE | Noted: 2019-03-08

## 2019-03-11 ENCOUNTER — TELEPHONE (OUTPATIENT)
Dept: FAMILY MEDICINE CLINIC | Facility: CLINIC | Age: 64
End: 2019-03-11

## 2019-03-11 NOTE — TELEPHONE ENCOUNTER
Other options if trazodone are not working would be [place] which is only for insomnia. Or could try gabapentin if its more nerve pain that is keeping her awake, it is not a sleeping pill but often makes people sleepy.   We discussed both as options at her

## 2019-03-11 NOTE — TELEPHONE ENCOUNTER
Patient states that Trazodone 100mg, is not working for her she states that she falls asleep but than wakes up at 2 am and is not able to go back to sleep until 5 or 6 am. PT is getting about 4 hrs of sleep.  Please advise

## 2019-03-11 NOTE — TELEPHONE ENCOUNTER
Patient called - she was here on 03-06-19 and was prescribed traZODone HCl 100 MG Oral Tab. She staed Dr Barbie Wild wanted her to call and advise if medication is working and it is NOT. Per patient Dr. Barbie Wild had another medication she may be able to try.  [place]

## 2019-03-12 ENCOUNTER — TELEPHONE (OUTPATIENT)
Dept: SURGERY | Facility: CLINIC | Age: 64
End: 2019-03-12

## 2019-03-12 RX ORDER — GABAPENTIN 300 MG/1
300 CAPSULE ORAL NIGHTLY
Qty: 30 CAPSULE | Refills: 0 | Status: SHIPPED | OUTPATIENT
Start: 2019-03-12 | End: 2019-03-25

## 2019-03-12 NOTE — TELEPHONE ENCOUNTER
Pt informed and states will stop the Trazodone and expressed understanding and agreement. Task completed.

## 2019-03-12 NOTE — TELEPHONE ENCOUNTER
Spoke with pt, she states she was on both gabapentin and amitriptyline together and was \"very sleepy and then foggy next day\". Feels it is insomnia not pain.  Shooting pain in arm very slowly improving\", starts at 5pm, takes Tramadol, positions her arm

## 2019-03-12 NOTE — TELEPHONE ENCOUNTER
pt asking \"which levels of nerve damage\" she has, please send pt this info via [employer] if possible; pt having RFA on 03/15 through [employer], did not have physician's name

## 2019-03-12 NOTE — TELEPHONE ENCOUNTER
Laura Hoff MD   You Just now (1:40 PM)      Left C8      My chart message sent to patient regarding above information.

## 2019-03-18 ENCOUNTER — TELEPHONE (OUTPATIENT)
Dept: FAMILY MEDICINE CLINIC | Facility: CLINIC | Age: 64
End: 2019-03-18

## 2019-03-18 NOTE — TELEPHONE ENCOUNTER
Pt states the Gabapentin is \"doing nothing\", sleeping only 4 hours each night since starting this. Wondering about Ambien. Pt aware Dr. Daniel Chen is out of the office.      See also 3-11-19 phone note:    Spoke with pt, she states she was on both gabapenti

## 2019-03-25 RX ORDER — ZOLPIDEM TARTRATE 10 MG/1
10 TABLET ORAL NIGHTLY PRN
Qty: 30 TABLET | Refills: 0 | Status: SHIPPED | OUTPATIENT
Start: 2019-03-25 | End: 2019-03-25 | Stop reason: CLARIF

## 2019-03-25 RX ORDER — GABAPENTIN 300 MG/1
300 CAPSULE ORAL NIGHTLY
Qty: 90 CAPSULE | Refills: 1 | Status: SHIPPED | OUTPATIENT
Start: 2019-03-25 | End: 2019-04-08

## 2019-03-25 NOTE — TELEPHONE ENCOUNTER
Dr. Leahy Persons: pt wanting to continue Gabapentin due to having a nerve block >1 week, she states she is 80% improved. Informed pt of Dr. Viraj Garcia recommendations and she expressed understanding and agreement. Called in RX.

## 2019-04-08 NOTE — PROGRESS NOTES
HPI:   Doreen Barrientos is a 66year old female that presents for ongoing insomnia issues. Did not respond well to trazodone or gabapentin. Ambien helped somewhat, able to sleep for about 5 hours but inconsistently.   Neck pain is better s/p recent nerve DDD (degenerative disc disease), lumbar      Thoracic spine pain      Discoid lupus erythematosus of left eyelid      Hypertension      Alopecia areata      Vitamin D deficiency      Mixed hyperlipidemia      Symptomatic menopausal or female climacteric st daily to eyebrow ), Disp: 60 mL, Rfl: 2  •  ibuprofen (MOTRIN) 200 MG Oral Tab, Take 200 mg by mouth every 6 (six) hours as needed for Pain., Disp: , Rfl:     REVIEW OF SYSTEMS:     Comprehensive ROS negative unless noted in HPI    PHYSICAL EXAM:   / - POCT URINALYSIS DIPSTICK       Risks, benefits, and alternatives of current treatment plan discussed in detail. Questions and concerns addressed. Red flags to RTC or ED reviewed. Patient (or parent) agrees to plan.       Return if symptoms worsen or f

## 2019-04-25 ENCOUNTER — TELEPHONE (OUTPATIENT)
Dept: FAMILY MEDICINE CLINIC | Facility: CLINIC | Age: 64
End: 2019-04-25

## 2019-04-25 ENCOUNTER — TELEPHONE (OUTPATIENT)
Dept: SURGERY | Facility: CLINIC | Age: 64
End: 2019-04-25

## 2019-04-25 NOTE — TELEPHONE ENCOUNTER
Pt called again, states Dr. Mayuri Haley called her and she has an appointment with him on Monday 4-29-19 and will cancel with Dr. Sunita Parada. Cancelled Dr. Sunita Parada appointment for 4-26-19, informed Dr. Sunita Parada. Task completed.

## 2019-04-25 NOTE — TELEPHONE ENCOUNTER
See attached phone message. Any Farmer 4/8/19 Insomnia/left lower back pain partial notes:  2. Left-sided low back pain without sciatica, unspecified chronicity  - traMADol HCl 50 MG Oral Tab;  Take 1 tablet (50 mg total) by mouth every 6 (six) trae

## 2019-04-25 NOTE — TELEPHONE ENCOUNTER
Pt returned call, she states she would prefer to see Dr. Eliu Mariee tomorrow and not [facility], confirmed tomorrow's appointment with pt. Again, pt aware Dr. Eliu Mariee does not prescribe Morphine. Task completed.

## 2019-04-25 NOTE — TELEPHONE ENCOUNTER
Advised pt, pt may still keep the appointment tomorrow with Dr. Polina Ferreira to go over everything. She states her insurance changes in 6 days. She is calling Pain MD now and will call back to update.

## 2019-04-25 NOTE — TELEPHONE ENCOUNTER
Pt called stating she moved 3 weeks ago to [place], [employer] out of network and out [employer] pt will have information transferred to area hospital. 3 weeks ago pt started having back pain and was admitted in hospital since then got release from E

## 2019-04-25 NOTE — TELEPHONE ENCOUNTER
Unfortunately, I do not prescribe morphine.   Would have to come from pain specialist.      Bonilla Henning, DO  Family Medicine

## 2019-04-25 NOTE — TELEPHONE ENCOUNTER
Pt would like a refill for   Zolpidem Tartrate (Tab) AMBIEN 10 MG Oral Take 1 tablet (10 mg total) by mouth nightly. [address] [address] [address], [address] [facility] [phone], [phone]    Pt has week remaining.

## 2019-04-25 NOTE — TELEPHONE ENCOUNTER
Patient was last evaluated in office 3/6/19    ASSESSMENT:  65 yo F with progressive left arm pain, subtle hand and tricep weakness s/p prior cervical fusion. Adjacent segment stenosis likely with progressive radiculopathy.  Given her sternum/clavicle, anastacio

## 2019-04-25 NOTE — TELEPHONE ENCOUNTER
Pt lives [place] away and cannot sit for any period of time, so she doesn't think she can make her appt but she needs her pain management medication and that can only be done by Dr. Elida Meraz.  Pt wants to talk with  or do some kind of video chat f

## 2019-04-26 ENCOUNTER — TELEPHONE (OUTPATIENT)
Dept: FAMILY MEDICINE CLINIC | Facility: CLINIC | Age: 64
End: 2019-04-26

## 2019-04-26 DIAGNOSIS — F51.01 PRIMARY INSOMNIA: ICD-10-CM

## 2019-04-26 RX ORDER — ZOLPIDEM TARTRATE 10 MG/1
10 TABLET ORAL NIGHTLY
Qty: 30 TABLET | Refills: 1 | Status: CANCELLED | OUTPATIENT
Start: 2019-04-26

## 2019-04-26 NOTE — TELEPHONE ENCOUNTER
Zolpidem Tartrate 10 MG Oral Tab 30 tablet 1 4/8/2019     Sig - Route: Take 1 tablet (10 mg total) by mouth nightly. - Oral      [place] states they did not get request on April 8th. Please send to [facility]. Pt never had filled April 8th.     Patient would li

## 2019-04-26 NOTE — TELEPHONE ENCOUNTER
LOV 4/8/19- 1. Primary insomnia  - Zolpidem Tartrate 10 MG Oral Tab; Take 1 tablet (10 mg total) by mouth nightly. Dispense: 30 tablet;  Refill: 1  - will increase ambien from 5mg to 10 mg and refer to sleep specialist if still not able to sleep    Last re

## 2019-04-26 NOTE — TELEPHONE ENCOUNTER
Patient moved and she never picked up script that was sent on 4/8/19- that was sent to [facility].  Pharmacy updated and she would like script resent to new pharmacy.   Med pended and routed to pcp

## 2019-04-26 NOTE — TELEPHONE ENCOUNTER
Pt is calling back to ask for a month of the sleeping medication Ambien 10mg. Taken once nightly.     Please advise  [phone]

## 2019-04-29 ENCOUNTER — TELEPHONE (OUTPATIENT)
Dept: SURGERY | Facility: CLINIC | Age: 64
End: 2019-04-29

## 2019-04-29 ENCOUNTER — OFFICE VISIT (OUTPATIENT)
Dept: SURGERY | Facility: CLINIC | Age: 64
End: 2019-04-29

## 2019-04-29 VITALS — DIASTOLIC BLOOD PRESSURE: 74 MMHG | SYSTOLIC BLOOD PRESSURE: 124 MMHG | HEART RATE: 76 BPM

## 2019-04-29 DIAGNOSIS — M54.16 LUMBAR RADICULITIS: Primary | ICD-10-CM

## 2019-04-29 PROCEDURE — 99213 OFFICE O/P EST LOW 20 MIN: CPT | Performed by: NEUROLOGICAL SURGERY

## 2019-04-29 RX ORDER — OXYCODONE AND ACETAMINOPHEN 10; 325 MG/1; MG/1
1 TABLET ORAL EVERY 8 HOURS PRN
Qty: 30 TABLET | Refills: 0 | Status: SHIPPED | OUTPATIENT
Start: 2019-04-29

## 2019-04-29 NOTE — TELEPHONE ENCOUNTER
Spoke to pharmacist at [facility], called in prescription for Zolpidem tartrate 10mg, pharmacists verbalized understanding with intent to inform pt when ready to be picked up.       Disp Refills Start End    Zolpidem Tartrate 10 MG Oral Tab 30 tablet

## 2019-04-29 NOTE — TELEPHONE ENCOUNTER
Pharmacy should be able to transfer rx to another pharmacy without a new rx. Patient just has to call them ( or we can). If any issues, call me, and we can send a new one.      Juan M Baxter, DO  Family Medicine

## 2019-04-29 NOTE — TELEPHONE ENCOUNTER
Patient states she states she did call them to do a transfer to transfer and they said they were unable to do so as its a controlled medication.

## 2019-04-29 NOTE — PROGRESS NOTES
[facility] [place]  Neurosurgery Clinic Visit      HISTORY OF PRESENT ILLNESS:  Celia Bliss is a(n) 66year old female previously seen in clinic for neck and left arm pain.  Since that time, she had an ablation and has improved neck and arm [facility]   • CERVICAL EPIDURAL N/A 10/29/2018    Performed by Aiden Persaud MD at [address]  N/A 9/24/2018    Performed by Aiden Persaud MD at [facility] SURGICAL HISTORY      laparascopy times three   • OTHER SURGICAL HISTORY      laparatomy   • OTHER SURGICAL HISTORY      laser conization of cervix   • OTHER SURGICAL HISTORY      botox injection for migraines   • OTHER SURGICAL HISTORY  03/15/2019    Janice Alonzo 0.50 years. She has never used smokeless tobacco. She reports that she drinks alcohol. She reports that she does not use drugs.     ALLERGIES:    Codeine [Opioid Minal*    NAUSEA AND VOMITING  Sulfa Antibiotics       ANAPHYLAXIS, SWELLING, OTHER (SEE [phone]

## 2019-04-29 NOTE — TELEPHONE ENCOUNTER
pt brought in MR Lumbar w/wo contrast 4/19/19 from [facility] (CD only). Uploaded into PACS.  Mailed to patient per Dr Prakash Soler

## 2019-05-01 ENCOUNTER — MED REC SCAN ONLY (OUTPATIENT)
Dept: FAMILY MEDICINE CLINIC | Facility: CLINIC | Age: 64
End: 2019-05-01

## 2019-08-28 DIAGNOSIS — Z12.39 BREAST CANCER SCREENING: Primary | ICD-10-CM

## 2019-08-28 DIAGNOSIS — F41.9 ANXIETY: ICD-10-CM

## 2019-08-28 DIAGNOSIS — F51.01 PRIMARY INSOMNIA: ICD-10-CM

## 2019-08-28 DIAGNOSIS — Z00.00 LABORATORY EXAM ORDERED AS PART OF ROUTINE GENERAL MEDICAL EXAMINATION: ICD-10-CM

## 2019-08-28 RX ORDER — LORAZEPAM 0.5 MG/1
TABLET ORAL
Qty: 30 TABLET | Refills: 5 | Status: SHIPPED
Start: 2019-08-28

## 2019-08-28 RX ORDER — ZOLPIDEM TARTRATE 10 MG/1
TABLET ORAL
Qty: 30 TABLET | Refills: 5 | Status: SHIPPED
Start: 2019-08-28

## 2019-08-28 NOTE — TELEPHONE ENCOUNTER
Medication(s) to Refill:   Requested Prescriptions     Pending Prescriptions Disp Refills   • ZOLPIDEM TARTRATE 10 MG Oral Tab [Pharmacy Med Name: ZOLPIDEM 10MG       TAB] 30 tablet 1     Sig: TAKE 1 TABLET BY MOUTH ONCE DAILY AT BEDTIME   • LORAZEPAM 0.5 76 02/16/2019    CA 9.5 02/16/2019    OSMOCALC 286 02/16/2019    ALKPHO 53 02/16/2019    AST 31 02/16/2019    ALT 28 02/16/2019    BILT 0.4 02/16/2019    TP 7.7 02/16/2019    ALB 4.0 02/16/2019    GLOBULIN 3.7 02/16/2019    AGRATIO 1.9 04/02/2016

## 2019-08-28 NOTE — TELEPHONE ENCOUNTER
rx filled. Pt due for annual exam and mammogram upcoming, please get labs 1-2 days before appt.     Guru De Leon, DO  Family Medicine

## 2019-11-03 DIAGNOSIS — E03.9 ACQUIRED HYPOTHYROIDISM: ICD-10-CM

## 2019-11-05 RX ORDER — LEVOTHYROXINE SODIUM 0.05 MG/1
TABLET ORAL
Qty: 30 TABLET | Refills: 0 | Status: SHIPPED | OUTPATIENT
Start: 2019-11-05 | End: 2019-11-05

## 2019-11-05 NOTE — TELEPHONE ENCOUNTER
Patient moved she is seeing new PCP.   Dr Darlene Fajardo updated     -Patient has informed her insurance and pharmacy of this change

## 2019-11-05 NOTE — TELEPHONE ENCOUNTER
Levothyroxine filled for 30 days, pt overdue for appt and labs.     Lizabeth Navas, DO  Family Medicine

## 2019-11-05 NOTE — TELEPHONE ENCOUNTER
Requested Prescriptions      Name from pharmacy: LEVOTHYROXINE 0.05MG (50MCG) TAB         Will file in chart as: LEVOTHYROXINE SODIUM 50 MCG Oral Tab         Sig: TAKE 1 TABLET BY MOUTH BEFORE BREAKFAST    Disp:  90 tablet    Refills:  3 (Pharmacy requeste

## 2019-12-03 DIAGNOSIS — E03.9 ACQUIRED HYPOTHYROIDISM: ICD-10-CM

## 2019-12-04 NOTE — TELEPHONE ENCOUNTER
Requested Prescriptions      Name from pharmacy: LEVOTHYROXINE 0.05MG (50MCG) TAB         Will file in chart as: LEVOTHYROXINE SODIUM 50 MCG Oral Tab    The source prescription was discontinued on 11/5/2019 by Sierra Bullock DO for the following reason:

## 2019-12-05 RX ORDER — LEVOTHYROXINE SODIUM 0.05 MG/1
TABLET ORAL
Qty: 30 TABLET | Refills: 0 | OUTPATIENT
Start: 2019-12-05

## 2021-10-16 DIAGNOSIS — F51.01 PRIMARY INSOMNIA: ICD-10-CM

## 2021-10-18 RX ORDER — ZOLPIDEM TARTRATE 10 MG/1
TABLET ORAL
Qty: 30 TABLET | Refills: 0 | OUTPATIENT
Start: 2021-10-18

## 2024-10-02 ENCOUNTER — APPOINTMENT (RX ONLY)
Dept: URBAN - METROPOLITAN AREA CLINIC 169 | Facility: CLINIC | Age: 69
Setting detail: DERMATOLOGY
End: 2024-10-02

## 2024-10-02 DIAGNOSIS — L57.0 ACTINIC KERATOSIS: ICD-10-CM

## 2024-10-02 DIAGNOSIS — D49.2 NEOPLASM OF UNSPECIFIED BEHAVIOR OF BONE, SOFT TISSUE, AND SKIN: ICD-10-CM

## 2024-10-02 DIAGNOSIS — L57.8 OTHER SKIN CHANGES DUE TO CHRONIC EXPOSURE TO NONIONIZING RADIATION: ICD-10-CM

## 2024-10-02 PROCEDURE — ? LIQUID NITROGEN

## 2024-10-02 PROCEDURE — 99203 OFFICE O/P NEW LOW 30 MIN: CPT | Mod: 25

## 2024-10-02 PROCEDURE — ? COUNSELING

## 2024-10-02 PROCEDURE — 17003 DESTRUCT PREMALG LES 2-14: CPT

## 2024-10-02 PROCEDURE — ? BIOPSY BY SHAVE METHOD

## 2024-10-02 PROCEDURE — 11102 TANGNTL BX SKIN SINGLE LES: CPT

## 2024-10-02 PROCEDURE — 17000 DESTRUCT PREMALG LESION: CPT | Mod: 59

## 2024-10-02 ASSESSMENT — LOCATION SIMPLE DESCRIPTION DERM
LOCATION SIMPLE: LEFT UPPER BACK
LOCATION SIMPLE: POSTERIOR NECK
LOCATION SIMPLE: RIGHT FOREHEAD
LOCATION SIMPLE: CHIN

## 2024-10-02 ASSESSMENT — LOCATION DETAILED DESCRIPTION DERM
LOCATION DETAILED: LEFT SUPERIOR MEDIAL UPPER BACK
LOCATION DETAILED: RIGHT CHIN
LOCATION DETAILED: RIGHT MEDIAL FOREHEAD
LOCATION DETAILED: LEFT MEDIAL TRAPEZIAL NECK
LOCATION DETAILED: LEFT CHIN

## 2024-10-02 ASSESSMENT — LOCATION ZONE DERM
LOCATION ZONE: TRUNK
LOCATION ZONE: NECK
LOCATION ZONE: FACE

## (undated) NOTE — LETTER
Patient Name: [facility] [facility]  YOB: 1955          MRN number:  [id number]  Date:  11/26/2018  Referring Physician:  Nara Garcia  Discharge Summary  Initial Functional Outcome Score 40/100  Final Functional Outcome Score 54/100  Number of Visits Att Improved lumbar flexion to enable don/doff socks and shoes without increased complaints. -->  In progress  Able to tolerate walking 4 blocks with decreased pain to 2/10 levels.  --> progressing    Plan:  Pt has completed 16/16 visits and can now continue wi

## (undated) NOTE — LETTER
19        RE: Verner Neighbors     : 1955    Dear Dr. Betsy Mckenzie,    This letter is to inform you that your patient is being scheduled for surgery with Dr. Reid Harkins on 2019 at [facility]. We have asked the patient to contact your office to

## (undated) NOTE — LETTER
10/23/19        [address] [place] [id number]      Dear Rocio Willis,    [id number] records indicate that you have outstanding lab work and or testing that was ordered for you and has not yet been completed:  Orders Placed This Encoun

## (undated) NOTE — ED AVS SNAPSHOT
Dalton Villanueva   MRN: [id number]    Department:  [facility] Emergency Department in [place]   Date of Visit:  2/2/2018           Disclosure     Insurance plans vary and the physician(s) referred by the ER may not be covered by your plan.  Please contac tell this physician (or your personal doctor if your instructions are to return to your personal doctor) about any new or lasting problems. The primary care or specialist physician will see patients referred from the [facility] Emergency Department.  Harshil Correa